# Patient Record
Sex: MALE | Race: BLACK OR AFRICAN AMERICAN | URBAN - METROPOLITAN AREA
[De-identification: names, ages, dates, MRNs, and addresses within clinical notes are randomized per-mention and may not be internally consistent; named-entity substitution may affect disease eponyms.]

---

## 2022-03-30 ENCOUNTER — HOSPITAL ENCOUNTER (OUTPATIENT)
Age: 67
Setting detail: SPECIMEN
Discharge: HOME | End: 2022-03-30

## 2022-03-30 LAB
ALBUMIN SERPL-MCNC: 4.1 G/DL (ref 3.5–5.2)
ALBUMIN/GLOBULIN RATIO: 1.3 (ref 1–2.5)
ALP BLD-CCNC: 57 U/L (ref 40–129)
ALT SERPL-CCNC: 13 U/L (ref 5–41)
ANION GAP SERPL CALCULATED.3IONS-SCNC: 14 MMOL/L (ref 9–17)
AST SERPL-CCNC: 17 U/L
BILIRUB SERPL-MCNC: 0.43 MG/DL (ref 0.3–1.2)
BUN BLDV-MCNC: 21 MG/DL (ref 8–23)
CALCIUM SERPL-MCNC: 9.2 MG/DL (ref 8.6–10.4)
CHLORIDE BLD-SCNC: 95 MMOL/L (ref 98–107)
CHOLESTEROL/HDL RATIO: 4
CHOLESTEROL: 210 MG/DL
CO2: 25 MMOL/L (ref 20–31)
CREAT SERPL-MCNC: 0.98 MG/DL (ref 0.7–1.2)
GFR AFRICAN AMERICAN: >60 ML/MIN
GFR NON-AFRICAN AMERICAN: >60 ML/MIN
GFR SERPL CREATININE-BSD FRML MDRD: ABNORMAL ML/MIN/{1.73_M2}
GLUCOSE BLD-MCNC: 144 MG/DL (ref 70–99)
HCT VFR BLD CALC: 46.5 % (ref 40.7–50.3)
HDLC SERPL-MCNC: 52 MG/DL
HEMOGLOBIN: 15 G/DL (ref 13–17)
LDL CHOLESTEROL: 127 MG/DL (ref 0–130)
MCH RBC QN AUTO: 31.4 PG (ref 25.2–33.5)
MCHC RBC AUTO-ENTMCNC: 32.3 G/DL (ref 28.4–34.8)
MCV RBC AUTO: 97.3 FL (ref 82.6–102.9)
NRBC AUTOMATED: 0 PER 100 WBC
PDW BLD-RTO: 14.8 % (ref 11.8–14.4)
PLATELET # BLD: 264 K/UL (ref 138–453)
PMV BLD AUTO: 11.6 FL (ref 8.1–13.5)
POTASSIUM SERPL-SCNC: 3.8 MMOL/L (ref 3.7–5.3)
RBC # BLD: 4.78 M/UL (ref 4.21–5.77)
SODIUM BLD-SCNC: 134 MMOL/L (ref 135–144)
TOTAL PROTEIN: 7.3 G/DL (ref 6.4–8.3)
TRIGL SERPL-MCNC: 155 MG/DL
WBC # BLD: 6.9 K/UL (ref 3.5–11.3)

## 2023-09-13 ENCOUNTER — APPOINTMENT (OUTPATIENT)
Dept: GENERAL RADIOLOGY | Age: 68
End: 2023-09-13
Payer: MEDICARE

## 2023-09-13 ENCOUNTER — HOSPITAL ENCOUNTER (INPATIENT)
Age: 68
LOS: 2 days | Discharge: HOME | End: 2023-09-15
Attending: EMERGENCY MEDICINE
Payer: MEDICARE

## 2023-09-13 ENCOUNTER — APPOINTMENT (OUTPATIENT)
Dept: MRI IMAGING | Age: 68
End: 2023-09-13
Payer: MEDICARE

## 2023-09-13 ENCOUNTER — APPOINTMENT (OUTPATIENT)
Dept: CT IMAGING | Age: 68
End: 2023-09-13
Payer: MEDICARE
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
## 2023-09-13 DIAGNOSIS — R53.1 RIGHT SIDED WEAKNESS: Primary | ICD-10-CM

## 2023-09-13 PROBLEM — I65.01 VERTEBRAL ARTERY STENOSIS, RIGHT: Status: ACTIVE | Noted: 2023-09-13

## 2023-09-13 LAB
ALBUMIN SERPL BCG-MCNC: 3.8 G/DL (ref 3.5–5.1)
ALP SERPL-CCNC: 64 U/L (ref 38–126)
ALT SERPL W/O P-5'-P-CCNC: 13 U/L (ref 11–66)
ANION GAP SERPL CALC-SCNC: 12 MEQ/L (ref 8–16)
ANION GAP SERPL CALC-SCNC: 13 MEQ/L (ref 8–16)
AST SERPL-CCNC: 14 U/L (ref 5–40)
BASOPHILS ABSOLUTE: 0.1 THOU/MM3 (ref 0–0.1)
BASOPHILS NFR BLD AUTO: 0.8 %
BILIRUB CONJ SERPL-MCNC: < 0.2 MG/DL (ref 0–0.3)
BILIRUB SERPL-MCNC: 0.5 MG/DL (ref 0.3–1.2)
BUN SERPL-MCNC: 19 MG/DL (ref 7–22)
BUN SERPL-MCNC: 21 MG/DL (ref 7–22)
CALCIUM SERPL-MCNC: 8.8 MG/DL (ref 8.5–10.5)
CALCIUM SERPL-MCNC: 8.9 MG/DL (ref 8.5–10.5)
CHLORIDE SERPL-SCNC: 97 MEQ/L (ref 98–111)
CHLORIDE SERPL-SCNC: 97 MEQ/L (ref 98–111)
CO2 SERPL-SCNC: 27 MEQ/L (ref 23–33)
CO2 SERPL-SCNC: 28 MEQ/L (ref 23–33)
CREAT SERPL-MCNC: 0.9 MG/DL (ref 0.4–1.2)
CREAT SERPL-MCNC: 1.1 MG/DL (ref 0.4–1.2)
DEPRECATED RDW RBC AUTO: 50.1 FL (ref 35–45)
EOSINOPHIL NFR BLD AUTO: 1.7 %
EOSINOPHILS ABSOLUTE: 0.1 THOU/MM3 (ref 0–0.4)
ERYTHROCYTE [DISTWIDTH] IN BLOOD BY AUTOMATED COUNT: 14.6 % (ref 11.5–14.5)
FLUAV RNA RESP QL NAA+PROBE: NOT DETECTED
FLUBV RNA RESP QL NAA+PROBE: NOT DETECTED
GFR SERPL CREATININE-BSD FRML MDRD: > 60 ML/MIN/1.73M2
GFR SERPL CREATININE-BSD FRML MDRD: > 60 ML/MIN/1.73M2
GLUCOSE BLD STRIP.AUTO-MCNC: 178 MG/DL (ref 70–108)
GLUCOSE SERPL-MCNC: 116 MG/DL (ref 70–108)
GLUCOSE SERPL-MCNC: 244 MG/DL (ref 70–108)
HCT VFR BLD AUTO: 43.9 % (ref 42–52)
HGB BLD-MCNC: 14.4 GM/DL (ref 14–18)
IMM GRANULOCYTES # BLD AUTO: 0.02 THOU/MM3 (ref 0–0.07)
IMM GRANULOCYTES NFR BLD AUTO: 0.2 %
LIPASE SERPL-CCNC: 20 U/L (ref 5.6–51.3)
LYMPHOCYTES ABSOLUTE: 2.1 THOU/MM3 (ref 1–4.8)
LYMPHOCYTES NFR BLD AUTO: 25.8 %
MAGNESIUM SERPL-MCNC: 1.6 MG/DL (ref 1.6–2.4)
MCH RBC QN AUTO: 31 PG (ref 26–33)
MCHC RBC AUTO-ENTMCNC: 32.8 GM/DL (ref 32.2–35.5)
MCV RBC AUTO: 94.6 FL (ref 80–94)
MONOCYTES ABSOLUTE: 0.5 THOU/MM3 (ref 0.4–1.3)
MONOCYTES NFR BLD AUTO: 6.1 %
NEUTROPHILS NFR BLD AUTO: 65.4 %
NRBC BLD AUTO-RTO: 0 /100 WBC
OSMOLALITY SERPL CALC.SUM OF ELEC: 284.9 MOSMOL/KG (ref 275–300)
PH BLDV: 7.35 [PH] (ref 7.31–7.41)
PLATELET # BLD AUTO: 274 THOU/MM3 (ref 130–400)
PMV BLD AUTO: 10.3 FL (ref 9.4–12.4)
POTASSIUM SERPL-SCNC: 3.5 MEQ/L (ref 3.5–5.2)
POTASSIUM SERPL-SCNC: 3.6 MEQ/L (ref 3.5–5.2)
PROT SERPL-MCNC: 7.2 G/DL (ref 6.1–8)
RBC # BLD AUTO: 4.64 MILL/MM3 (ref 4.7–6.1)
SARS-COV-2 RNA RESP QL NAA+PROBE: NOT DETECTED
SEGMENTED NEUTROPHILS ABSOLUTE COUNT: 5.4 THOU/MM3 (ref 1.8–7.7)
SODIUM SERPL-SCNC: 137 MEQ/L (ref 135–145)
SODIUM SERPL-SCNC: 137 MEQ/L (ref 135–145)
T4 FREE SERPL-MCNC: 1.47 NG/DL (ref 0.93–1.76)
TROPONIN, HIGH SENSITIVITY: 14 NG/L (ref 0–12)
TROPONIN, HIGH SENSITIVITY: 15 NG/L (ref 0–12)
TSH SERPL DL<=0.005 MIU/L-ACNC: 0.36 UIU/ML (ref 0.4–4.2)
WBC # BLD AUTO: 8.3 THOU/MM3 (ref 4.8–10.8)

## 2023-09-13 PROCEDURE — 71045 X-RAY EXAM CHEST 1 VIEW: CPT

## 2023-09-13 PROCEDURE — 70496 CT ANGIOGRAPHY HEAD: CPT

## 2023-09-13 PROCEDURE — 70498 CT ANGIOGRAPHY NECK: CPT

## 2023-09-13 PROCEDURE — 36415 COLL VENOUS BLD VENIPUNCTURE: CPT

## 2023-09-13 PROCEDURE — 83036 HEMOGLOBIN GLYCOSYLATED A1C: CPT

## 2023-09-13 PROCEDURE — 83735 ASSAY OF MAGNESIUM: CPT

## 2023-09-13 PROCEDURE — 6360000004 HC RX CONTRAST MEDICATION: Performed by: EMERGENCY MEDICINE

## 2023-09-13 PROCEDURE — 87636 SARSCOV2 & INF A&B AMP PRB: CPT

## 2023-09-13 PROCEDURE — 6370000000 HC RX 637 (ALT 250 FOR IP): Performed by: NURSE PRACTITIONER

## 2023-09-13 PROCEDURE — 70551 MRI BRAIN STEM W/O DYE: CPT

## 2023-09-13 PROCEDURE — 84484 ASSAY OF TROPONIN QUANT: CPT

## 2023-09-13 PROCEDURE — 70450 CT HEAD/BRAIN W/O DYE: CPT

## 2023-09-13 PROCEDURE — 80076 HEPATIC FUNCTION PANEL: CPT

## 2023-09-13 PROCEDURE — 99285 EMERGENCY DEPT VISIT HI MDM: CPT

## 2023-09-13 PROCEDURE — 2060000000 HC ICU INTERMEDIATE R&B

## 2023-09-13 PROCEDURE — 82800 BLOOD PH: CPT

## 2023-09-13 PROCEDURE — 82948 REAGENT STRIP/BLOOD GLUCOSE: CPT

## 2023-09-13 PROCEDURE — 99223 1ST HOSP IP/OBS HIGH 75: CPT | Performed by: NURSE PRACTITIONER

## 2023-09-13 PROCEDURE — 93010 ELECTROCARDIOGRAM REPORT: CPT | Performed by: INTERNAL MEDICINE

## 2023-09-13 PROCEDURE — 80048 BASIC METABOLIC PNL TOTAL CA: CPT

## 2023-09-13 PROCEDURE — 93005 ELECTROCARDIOGRAM TRACING: CPT | Performed by: EMERGENCY MEDICINE

## 2023-09-13 PROCEDURE — 84443 ASSAY THYROID STIM HORMONE: CPT

## 2023-09-13 PROCEDURE — 84439 ASSAY OF FREE THYROXINE: CPT

## 2023-09-13 PROCEDURE — 83690 ASSAY OF LIPASE: CPT

## 2023-09-13 PROCEDURE — 85025 COMPLETE CBC W/AUTO DIFF WBC: CPT

## 2023-09-13 RX ORDER — ASPIRIN 81 MG/1
324 TABLET, CHEWABLE ORAL ONCE
Status: COMPLETED | OUTPATIENT
Start: 2023-09-13 | End: 2023-09-14

## 2023-09-13 RX ORDER — DEXTROSE MONOHYDRATE 100 MG/ML
INJECTION, SOLUTION INTRAVENOUS CONTINUOUS PRN
Status: DISCONTINUED | OUTPATIENT
Start: 2023-09-13 | End: 2023-09-15 | Stop reason: HOSPADM

## 2023-09-13 RX ORDER — ONDANSETRON 4 MG/1
4 TABLET, ORALLY DISINTEGRATING ORAL EVERY 8 HOURS PRN
Status: DISCONTINUED | OUTPATIENT
Start: 2023-09-13 | End: 2023-09-13 | Stop reason: SDUPTHER

## 2023-09-13 RX ORDER — IBUPROFEN 600 MG/1
1 TABLET ORAL PRN
Status: DISCONTINUED | OUTPATIENT
Start: 2023-09-13 | End: 2023-09-15 | Stop reason: HOSPADM

## 2023-09-13 RX ORDER — ONDANSETRON 2 MG/ML
4 INJECTION INTRAMUSCULAR; INTRAVENOUS EVERY 6 HOURS PRN
Status: DISCONTINUED | OUTPATIENT
Start: 2023-09-13 | End: 2023-09-15 | Stop reason: HOSPADM

## 2023-09-13 RX ORDER — ASPIRIN 81 MG/1
81 TABLET, CHEWABLE ORAL DAILY
Status: DISCONTINUED | OUTPATIENT
Start: 2023-09-14 | End: 2023-09-15 | Stop reason: HOSPADM

## 2023-09-13 RX ORDER — ONDANSETRON 4 MG/1
4 TABLET, ORALLY DISINTEGRATING ORAL EVERY 8 HOURS PRN
Status: DISCONTINUED | OUTPATIENT
Start: 2023-09-13 | End: 2023-09-15 | Stop reason: HOSPADM

## 2023-09-13 RX ORDER — AMLODIPINE BESYLATE 5 MG/1
5 TABLET ORAL DAILY
Status: ON HOLD | COMMUNITY
End: 2023-09-15

## 2023-09-13 RX ORDER — ACETAMINOPHEN 325 MG/1
650 TABLET ORAL EVERY 6 HOURS PRN
Status: DISCONTINUED | OUTPATIENT
Start: 2023-09-13 | End: 2023-09-15 | Stop reason: HOSPADM

## 2023-09-13 RX ORDER — POLYETHYLENE GLYCOL 3350 17 G/17G
17 POWDER, FOR SOLUTION ORAL DAILY PRN
Status: DISCONTINUED | OUTPATIENT
Start: 2023-09-13 | End: 2023-09-15 | Stop reason: HOSPADM

## 2023-09-13 RX ORDER — ONDANSETRON 2 MG/ML
4 INJECTION INTRAMUSCULAR; INTRAVENOUS EVERY 6 HOURS PRN
Status: DISCONTINUED | OUTPATIENT
Start: 2023-09-13 | End: 2023-09-13 | Stop reason: SDUPTHER

## 2023-09-13 RX ORDER — ACETAMINOPHEN 650 MG/1
650 SUPPOSITORY RECTAL EVERY 6 HOURS PRN
Status: DISCONTINUED | OUTPATIENT
Start: 2023-09-13 | End: 2023-09-15 | Stop reason: HOSPADM

## 2023-09-13 RX ORDER — ENOXAPARIN SODIUM 100 MG/ML
40 INJECTION SUBCUTANEOUS DAILY
Status: DISCONTINUED | OUTPATIENT
Start: 2023-09-14 | End: 2023-09-15 | Stop reason: HOSPADM

## 2023-09-13 RX ORDER — CLOPIDOGREL BISULFATE 75 MG/1
300 TABLET ORAL ONCE
Status: COMPLETED | OUTPATIENT
Start: 2023-09-13 | End: 2023-09-14

## 2023-09-13 RX ORDER — ATORVASTATIN CALCIUM 80 MG/1
80 TABLET, FILM COATED ORAL NIGHTLY
Status: DISCONTINUED | OUTPATIENT
Start: 2023-09-13 | End: 2023-09-15 | Stop reason: HOSPADM

## 2023-09-13 RX ORDER — INSULIN LISPRO 100 [IU]/ML
0-8 INJECTION, SOLUTION INTRAVENOUS; SUBCUTANEOUS EVERY 4 HOURS
Status: DISCONTINUED | OUTPATIENT
Start: 2023-09-13 | End: 2023-09-14

## 2023-09-13 RX ORDER — CLOPIDOGREL BISULFATE 75 MG/1
75 TABLET ORAL DAILY
Status: DISCONTINUED | OUTPATIENT
Start: 2023-09-14 | End: 2023-09-15 | Stop reason: HOSPADM

## 2023-09-13 RX ADMIN — IOPAMIDOL 80 ML: 755 INJECTION, SOLUTION INTRAVENOUS at 18:17

## 2023-09-13 RX ADMIN — ATORVASTATIN CALCIUM 80 MG: 80 TABLET, FILM COATED ORAL at 23:08

## 2023-09-13 ASSESSMENT — PAIN SCALES - GENERAL
PAINLEVEL_OUTOF10: 0
PAINLEVEL_OUTOF10: 0

## 2023-09-13 ASSESSMENT — PAIN - FUNCTIONAL ASSESSMENT
PAIN_FUNCTIONAL_ASSESSMENT: NONE - DENIES PAIN
PAIN_FUNCTIONAL_ASSESSMENT: NONE - DENIES PAIN

## 2023-09-13 NOTE — ED NOTES
Patient resting in bed watching television, no distress noted. Respirations easy and unlabored. Denies any further needs or concerns. Call light remains in reach.       Angi Kennedy RN  09/13/23 2203

## 2023-09-13 NOTE — ED PROVIDER NOTES
[facility] DEPT  EMERGENCY DEPARTMENT ENCOUNTER          Pt Name: Jing Monge  MRN: [id number]  [address] [facility] [place] 1955  Date of evaluation: 9/13/2023  Physician: Sherley Piña MD  Supervising Attending Physician: Chelsea Roca MD       [address]       Chief Complaint   Patient presents with    Fall    Extremity Weakness         HISTORY OF PRESENT ILLNESS    HPI  Jing Monge is a 79 y.o. male with PMH significant for DMT2 and HTN who presents to the emergency department from home, by private vehicle for evaluation of RUE and RLE weakness s/p fall from standing while moving from the bathroom to his bedroom around 4 AM this morning. Patient states that he felt weak and unsteady in both of his legs, causing him to fall. Last known well at 11 PM last night when he went to bed. Patient states he felt weak and fell onto his right side without head trauma, LOC, or back pain. Assumed that the symptoms would resolve but when they had not this evening, she began to worry therefore he came to the ER. Of note, patient reports recent cough and congestion. Denies headache, blurred vision, trouble with mastication, dizziness, fatigue, fever, chest pain, shortness of breath, abdominal pain, nausea, vomiting, constipation, diarrhea, dysuria, melena, hematochezia, numbness/edema in the extremities. The patient has no other acute complaints at this time. PAST MEDICAL AND SURGICAL HISTORY     Past Medical History:   Diagnosis Date    Diabetes ([address])     Hypertension      No past surgical history on file. MEDICATIONS   No current facility-administered medications for this encounter.     Current Outpatient Medications:     amLODIPine (NORVASC) 5 MG tablet, Take 1 tablet by mouth daily, Disp: , Rfl:     Previous Medications    AMLODIPINE (NORVASC) 5 MG TABLET    Take 1 tablet by mouth daily         SOCIAL HISTORY     Social History     Social History Narrative    Not on file

## 2023-09-13 NOTE — ED NOTES
Patient resting in bed with family at bedside, denies any further needs or concerns at this time. Call light in reach. Will continue to monitor.       Angi Kennedy RN  09/13/23 3435

## 2023-09-13 NOTE — ED NOTES
Presents to ED with family via private car with complaints of worsening weakness. Pt states he tripped and fell in the bathroom and fell on his left side. PT denies hitting head and denies any pain from fall. Pt reports since the fall he has had increased weakness which has made it hard for him to walk or stand at times. He reports he normally walks without assistance, but upon arrival pt needing assist x2 to pivot to bed. Pt denies any other concerns and denies any current illness. EKG completed.      Angi Kennedy, MARTA  09/13/23 [id number]

## 2023-09-14 PROBLEM — D75.89 MACROCYTOSIS: Status: ACTIVE | Noted: 2023-09-14

## 2023-09-14 PROBLEM — E78.5 DYSLIPIDEMIA: Status: ACTIVE | Noted: 2023-09-14

## 2023-09-14 PROBLEM — F14.90 COCAINE USE: Status: ACTIVE | Noted: 2023-09-14

## 2023-09-14 PROBLEM — I63.9 ACUTE ISCHEMIC STROKE (HCC): Status: ACTIVE | Noted: 2023-09-14

## 2023-09-14 PROBLEM — F17.200 CURRENT EVERY DAY SMOKER: Status: ACTIVE | Noted: 2023-09-14

## 2023-09-14 PROBLEM — E11.65 UNCONTROLLED TYPE 2 DIABETES MELLITUS WITH HYPERGLYCEMIA (HCC): Status: ACTIVE | Noted: 2023-09-14

## 2023-09-14 PROBLEM — I10 ESSENTIAL HYPERTENSION: Status: ACTIVE | Noted: 2023-09-14

## 2023-09-14 PROBLEM — F12.90 MARIJUANA USE: Status: ACTIVE | Noted: 2023-09-14

## 2023-09-14 PROBLEM — R53.1 RIGHT SIDED WEAKNESS: Status: ACTIVE | Noted: 2023-09-14

## 2023-09-14 LAB
AMPHETAMINES UR QL SCN: NEGATIVE
ANION GAP SERPL CALC-SCNC: 11 MEQ/L (ref 8–16)
BACTERIA: ABNORMAL
BARBITURATES UR QL SCN: NEGATIVE
BENZODIAZ UR QL SCN: NEGATIVE
BILIRUB UR QL STRIP: NEGATIVE
BUN SERPL-MCNC: 18 MG/DL (ref 7–22)
BZE UR QL SCN: POSITIVE
CA-I BLD ISE-SCNC: 1.15 MMOL/L (ref 1.12–1.32)
CALCIUM SERPL-MCNC: 8.7 MG/DL (ref 8.5–10.5)
CANNABINOIDS UR QL SCN: POSITIVE
CASTS #/AREA URNS LPF: ABNORMAL /LPF
CASTS #/AREA URNS LPF: ABNORMAL /LPF
CHARACTER UR: CLEAR
CHARCOAL URNS QL MICRO: ABNORMAL
CHLORIDE SERPL-SCNC: 97 MEQ/L (ref 98–111)
CHOLEST SERPL-MCNC: 184 MG/DL (ref 100–199)
CO2 SERPL-SCNC: 28 MEQ/L (ref 23–33)
COLOR UR: YELLOW
CREAT SERPL-MCNC: 1.1 MG/DL (ref 0.4–1.2)
CRYSTALS URNS QL MICRO: ABNORMAL
DEPRECATED MEAN GLUCOSE BLD GHB EST-ACNC: 180 MG/DL (ref 70–126)
DEPRECATED RDW RBC AUTO: 48.2 FL (ref 35–45)
EKG ATRIAL RATE: 80 BPM
EKG P AXIS: 54 DEGREES
EKG P-R INTERVAL: 208 MS
EKG Q-T INTERVAL: 396 MS
EKG QRS DURATION: 106 MS
EKG QTC CALCULATION (BAZETT): 456 MS
EKG R AXIS: -42 DEGREES
EKG T AXIS: 66 DEGREES
EKG VENTRICULAR RATE: 80 BPM
EPITHELIAL CELLS, UA: ABNORMAL /HPF
ERYTHROCYTE [DISTWIDTH] IN BLOOD BY AUTOMATED COUNT: 14.4 % (ref 11.5–14.5)
FENTANYL: NEGATIVE
GFR SERPL CREATININE-BSD FRML MDRD: > 60 ML/MIN/1.73M2
GLUCOSE BLD STRIP.AUTO-MCNC: 120 MG/DL (ref 70–108)
GLUCOSE BLD STRIP.AUTO-MCNC: 160 MG/DL (ref 70–108)
GLUCOSE BLD STRIP.AUTO-MCNC: 164 MG/DL (ref 70–108)
GLUCOSE BLD STRIP.AUTO-MCNC: 168 MG/DL (ref 70–108)
GLUCOSE BLD STRIP.AUTO-MCNC: 245 MG/DL (ref 70–108)
GLUCOSE SERPL-MCNC: 156 MG/DL (ref 70–108)
GLUCOSE UR QL STRIP.AUTO: 500 MG/DL
HBA1C MFR BLD HPLC: 8 % (ref 4.4–6.4)
HCT VFR BLD AUTO: 40.3 % (ref 42–52)
HDLC SERPL-MCNC: 44 MG/DL
HGB BLD-MCNC: 13.3 GM/DL (ref 14–18)
HGB UR QL STRIP.AUTO: NEGATIVE
KETONES UR QL STRIP.AUTO: ABNORMAL
LDLC SERPL CALC-MCNC: 120 MG/DL
LEUKOCYTE ESTERASE UR QL STRIP.AUTO: NEGATIVE
MAGNESIUM SERPL-MCNC: 1.8 MG/DL (ref 1.6–2.4)
MCH RBC QN AUTO: 30.4 PG (ref 26–33)
MCHC RBC AUTO-ENTMCNC: 33 GM/DL (ref 32.2–35.5)
MCV RBC AUTO: 92.2 FL (ref 80–94)
NITRITE UR QL STRIP.AUTO: NEGATIVE
OPIATES UR QL SCN: NEGATIVE
OXYCODONE: NEGATIVE
PCP UR QL SCN: NEGATIVE
PH UR STRIP.AUTO: 6 [PH] (ref 5–9)
PLATELET # BLD AUTO: 277 THOU/MM3 (ref 130–400)
PMV BLD AUTO: 10.4 FL (ref 9.4–12.4)
POTASSIUM SERPL-SCNC: 3.1 MEQ/L (ref 3.5–5.2)
PROT UR STRIP.AUTO-MCNC: 100 MG/DL
RBC # BLD AUTO: 4.37 MILL/MM3 (ref 4.7–6.1)
RBC #/AREA URNS HPF: ABNORMAL /HPF
RENAL EPI CELLS #/AREA URNS HPF: ABNORMAL /[HPF]
SODIUM SERPL-SCNC: 136 MEQ/L (ref 135–145)
SP GR UR REFRACT.AUTO: > 1.03 (ref 1–1.03)
TRIGL SERPL-MCNC: 100 MG/DL (ref 0–199)
UROBILINOGEN UR QL STRIP.AUTO: 1 EU/DL (ref 0–1)
WBC # BLD AUTO: 7.9 THOU/MM3 (ref 4.8–10.8)
WBC #/AREA URNS HPF: ABNORMAL /HPF
YEAST LIKE FUNGI URNS QL MICRO: ABNORMAL

## 2023-09-14 PROCEDURE — 93306 TTE W/DOPPLER COMPLETE: CPT

## 2023-09-14 PROCEDURE — 6370000000 HC RX 637 (ALT 250 FOR IP): Performed by: NURSE PRACTITIONER

## 2023-09-14 PROCEDURE — 80061 LIPID PANEL: CPT

## 2023-09-14 PROCEDURE — 6360000002 HC RX W HCPCS: Performed by: NURSE PRACTITIONER

## 2023-09-14 PROCEDURE — 36415 COLL VENOUS BLD VENIPUNCTURE: CPT

## 2023-09-14 PROCEDURE — 97162 PT EVAL MOD COMPLEX 30 MIN: CPT

## 2023-09-14 PROCEDURE — 81001 URINALYSIS AUTO W/SCOPE: CPT

## 2023-09-14 PROCEDURE — 97166 OT EVAL MOD COMPLEX 45 MIN: CPT

## 2023-09-14 PROCEDURE — 2060000000 HC ICU INTERMEDIATE R&B

## 2023-09-14 PROCEDURE — 82330 ASSAY OF CALCIUM: CPT

## 2023-09-14 PROCEDURE — 83735 ASSAY OF MAGNESIUM: CPT

## 2023-09-14 PROCEDURE — 97535 SELF CARE MNGMENT TRAINING: CPT

## 2023-09-14 PROCEDURE — 85027 COMPLETE CBC AUTOMATED: CPT

## 2023-09-14 PROCEDURE — 6370000000 HC RX 637 (ALT 250 FOR IP): Performed by: HOSPITALIST

## 2023-09-14 PROCEDURE — 80307 DRUG TEST PRSMV CHEM ANLYZR: CPT

## 2023-09-14 PROCEDURE — 99232 SBSQ HOSP IP/OBS MODERATE 35: CPT | Performed by: HOSPITALIST

## 2023-09-14 PROCEDURE — 97116 GAIT TRAINING THERAPY: CPT

## 2023-09-14 PROCEDURE — 82948 REAGENT STRIP/BLOOD GLUCOSE: CPT

## 2023-09-14 PROCEDURE — 80048 BASIC METABOLIC PNL TOTAL CA: CPT

## 2023-09-14 RX ORDER — POTASSIUM CHLORIDE 7.45 MG/ML
10 INJECTION INTRAVENOUS PRN
Status: DISCONTINUED | OUTPATIENT
Start: 2023-09-14 | End: 2023-09-15 | Stop reason: HOSPADM

## 2023-09-14 RX ORDER — POTASSIUM CHLORIDE 20 MEQ/1
40 TABLET, EXTENDED RELEASE ORAL PRN
Status: DISCONTINUED | OUTPATIENT
Start: 2023-09-14 | End: 2023-09-15 | Stop reason: HOSPADM

## 2023-09-14 RX ORDER — INSULIN LISPRO 100 [IU]/ML
0-8 INJECTION, SOLUTION INTRAVENOUS; SUBCUTANEOUS
Status: DISCONTINUED | OUTPATIENT
Start: 2023-09-14 | End: 2023-09-15 | Stop reason: HOSPADM

## 2023-09-14 RX ADMIN — POTASSIUM CHLORIDE 40 MEQ: 1500 TABLET, EXTENDED RELEASE ORAL at 15:12

## 2023-09-14 RX ADMIN — CLOPIDOGREL BISULFATE 300 MG: 75 TABLET ORAL at 01:12

## 2023-09-14 RX ADMIN — INSULIN LISPRO 2 UNITS: 100 INJECTION, SOLUTION INTRAVENOUS; SUBCUTANEOUS at 11:26

## 2023-09-14 RX ADMIN — ENOXAPARIN SODIUM 40 MG: 100 INJECTION SUBCUTANEOUS at 09:46

## 2023-09-14 RX ADMIN — CLOPIDOGREL BISULFATE 75 MG: 75 TABLET ORAL at 09:46

## 2023-09-14 RX ADMIN — ASPIRIN 81 MG 324 MG: 81 TABLET ORAL at 01:12

## 2023-09-14 RX ADMIN — ASPIRIN 81 MG 81 MG: 81 TABLET ORAL at 09:46

## 2023-09-14 RX ADMIN — ATORVASTATIN CALCIUM 80 MG: 80 TABLET, FILM COATED ORAL at 20:33

## 2023-09-14 ASSESSMENT — ENCOUNTER SYMPTOMS
ABDOMINAL PAIN: 0
VOMITING: 0
COUGH: 0
SHORTNESS OF BREATH: 0
RHINORRHEA: 0
NAUSEA: 0
SORE THROAT: 0

## 2023-09-14 ASSESSMENT — PAIN SCALES - GENERAL
PAINLEVEL_OUTOF10: 0

## 2023-09-14 NOTE — CARE COORDINATION
Case Management Assessment  Initial Evaluation    Date/Time of Evaluation: 9/14/2023 10:47 AM  Assessment Completed by: Emma Leyva RN    If patient is discharged prior to next notation, then this note serves as note for discharge by case management. Patient Name: Inna Quiles                   YOB: 1955  Diagnosis: Right sided weakness [R53.1]  Fall from standing, initial encounter [W19. XXXA]  Vertebral artery stenosis, right [I65.01]                   Date / Time: 9/13/2023  4:06 PM  Location: [facility]19/[facility]     Patient Admission Status: Inpatient   Readmission Risk Low 0-14, Mod 15-19), High > 20: Readmission Risk Score: 8.4    Current PCP: ENOC Cabrera CNP  PCP verified by ? Yes    Chart Reviewed: Yes      History Provided by: Patient  Patient Orientation: Alert and Oriented    Patient Cognition: Alert    Hospitalization in the last 30 days (Readmission):  No    If yes, Readmission Assessment in CM Navigator will be completed. Advance Directives:      Code Status: Full Code   Patient's Primary Decision Maker is: Legal Next of Kin (Emergency contact added to epic.)      Discharge Planning:    Patient lives with: Friends Type of Home: House  Primary Care Giver: Self  Patient Support Systems include: Children, Family Members, Friends/Neighbors   Current Financial resources: Medicare  Current community resources: None  Current services prior to admission: Durable Medical Equipment            Current DME: Walker            Type of Home Care services:  None    ADLS  Prior functional level: Independent in ADLs/IADLs  Current functional level: Assistance with the following:, Shopping, Housework    Family can provide assistance at DC: Yes  Would you like Case Management to discuss the discharge plan with any other family members/significant others, and if so, who?  No  Plans to Return to Present Housing: Yes  Other Identified Issues/Barriers to RETURNING to current housing: n/a  Potential Goals/Plan/Treatment Preferences: Spoke with [employer], he plans to return home with roommate. He does have a daughter in area. Uses a walker at times. Follow for OP therapy needs. Emergency contact added to epic. May need a cab ride home. Transportation/Food Security/Housekeeping Addressed: No issues identified.      Juice Armstrong RN  Case Management Department

## 2023-09-14 NOTE — PLAN OF CARE
Problem: Discharge Planning  Goal: Discharge to home or other facility with appropriate resources  9/14/2023 1004 by Marlen Short RN  Outcome: Progressing  Flowsheets (Taken 9/14/2023 1004)  Discharge to home or other facility with appropriate resources:   Identify barriers to discharge with patient and caregiver   Arrange for needed discharge resources and transportation as appropriate   Identify discharge learning needs (meds, wound care, etc)   Arrange for interpreters to assist at discharge as needed   Refer to discharge planning if patient needs post-hospital services based on physician order or complex needs related to functional status, cognitive ability or social support system       Problem: Pain  Goal: Verbalizes/displays adequate comfort level or baseline comfort level  9/14/2023 1004 by Marlen Short RN  Outcome: Progressing  Flowsheets (Taken 9/14/2023 1004)  Verbalizes/displays adequate comfort level or baseline comfort level:   Encourage patient to monitor pain and request assistance   Assess pain using appropriate pain scale   Administer analgesics based on type and severity of pain and evaluate response   Implement non-pharmacological measures as appropriate and evaluate response   Consider cultural and social influences on pain and pain management   Notify Licensed Independent Practitioner if interventions unsuccessful or patient reports new pain       Problem: Safety - Adult  Goal: Free from fall injury  9/14/2023 1004 by Marlen Short RN  Outcome: Progressing  Note: Patient remained free from falls this shift. Bed is in low position with alarm on and siderails up x2. Patient ambulates with one assist. Education given on use of call light and patient voiced understanding. Patient uses call light appropriately. Call light and beside table within reach. Arm band and falling star in place.          Problem: Neurosensory - Adult  Goal: Achieves stable or improved neurological status  9/14/2023 1004 by therapy   Encourage visually impaired, hearing impaired and aphasic patients to use assistive/communication devices       Problem: Skin/Tissue Integrity - Adult  Goal: Skin integrity remains intact  9/14/2023 1004 by Franco Duverney, RN  Outcome: Progressing  Flowsheets (Taken 9/14/2023 1004)  Skin Integrity Remains Intact: Monitor for areas of redness and/or skin breakdown       Problem: Musculoskeletal - Adult  Goal: Return mobility to safest level of function  9/14/2023 1004 by Franco Duverney, RN  Outcome: Progressing  Flowsheets (Taken 9/14/2023 1004)  Return Mobility to Safest Level of Function:   Assess patient stability and activity tolerance for standing, transferring and ambulating with or without assistive devices   Assist with transfers and ambulation using safe patient handling equipment as needed   Ensure adequate protection for wounds/incisions during mobilization   Obtain physical therapy/occupational therapy consults as needed   Instruct patient/family in ordered activity level    Goal: Return ADL status to a safe level of function  9/14/2023 1004 by Franco Duverney, RN  Outcome: Progressing  Flowsheets (Taken 9/14/2023 1004)  Return ADL Status to a Safe Level of Function:   Administer medication as ordered   Assess activities of daily living deficits and provide assistive devices as needed   Obtain physical therapy/occupational therapy consults as needed   Assist and instruct patient to increase activity and self care as tolerated       Problem: Chronic Conditions and Co-morbidities  Goal: Patient's chronic conditions and co-morbidity symptoms are monitored and maintained or improved  Outcome: Progressing  Flowsheets (Taken 9/14/2023 1004)  Care Plan - Patient's Chronic Conditions and Co-Morbidity Symptoms are Monitored and Maintained or Improved:   Monitor and assess patient's chronic conditions and comorbid symptoms for stability, deterioration, or improvement   Collaborate with multidisciplinary team to

## 2023-09-14 NOTE — PLAN OF CARE
Problem: Discharge Planning  Goal: Discharge to home or other facility with appropriate resources  Outcome: Progressing  Flowsheets (Taken 9/14/2023 0045)  Discharge to home or other facility with appropriate resources:   Identify barriers to discharge with patient and caregiver   Identify discharge learning needs (meds, wound care, etc)   Refer to discharge planning if patient needs post-hospital services based on physician order or complex needs related to functional status, cognitive ability or social support system   Arrange for needed discharge resources and transportation as appropriate     Problem: Pain  Goal: Verbalizes/displays adequate comfort level or baseline comfort level  Outcome: Progressing  Flowsheets (Taken 9/14/2023 0045)  Verbalizes/displays adequate comfort level or baseline comfort level:   Encourage patient to monitor pain and request assistance   Assess pain using appropriate pain scale   Administer analgesics based on type and severity of pain and evaluate response   Implement non-pharmacological measures as appropriate and evaluate response   Consider cultural and social influences on pain and pain management   Notify Licensed Independent Practitioner if interventions unsuccessful or patient reports new pain     Problem: Safety - Adult  Goal: Free from fall injury  Outcome: Progressing  Flowsheets (Taken 9/14/2023 0045)  Free From Fall Injury: Instruct family/caregiver on patient safety  Note: Patient remains free from falls at this time. Bed is locked in lowest position with alarm on and side rails up. Call light and personal belongings are within reach. \"Call, don't fall\" policy explained. Fall wristband and nonslip socks are on patient and fall risk sign is posted.       Problem: Neurosensory - Adult  Goal: Achieves stable or improved neurological status  Outcome: Progressing  Flowsheets (Taken 9/14/2023 0045)  Achieves stable or improved neurological status:   Assess for and report changes in neurological status   Initiate measures to prevent increased intracranial pressure   Maintain blood pressure and fluid volume within ordered parameters to optimize cerebral perfusion and minimize risk of hemorrhage   Monitor temperature, glucose, and sodium. Initiate appropriate interventions as ordered     Problem: Neurosensory - Adult  Goal: Absence of seizures  Outcome: Progressing  Flowsheets (Taken 9/14/2023 0045)  Absence of seizures:   Monitor for seizure activity. If seizure occurs, document type and location of movements and any associated apnea   Administer anticonvulsants as ordered   Diagnostic studies as ordered   If seizure occurs, turn head to side and suction secretions as needed   Support airway/breathing, administer oxygen as needed  Note: Pt remains free from seizure activity at this time. Will continue to monitor.      Problem: Neurosensory - Adult  Goal: Remains free of injury related to seizures activity  Outcome: Progressing  Flowsheets (Taken 9/14/2023 0045)  Remains free of injury related to seizure activity:   Maintain airway, patient safety  and administer oxygen as ordered   Monitor patient for seizure activity, document and report duration and description of seizure to Licensed Independent Practitioner   If seizure occurs, turn patient to side and suction secretions as needed   Reorient patient post seizure   Seizure pads on all 4 side rails   Instruct patient/family to notify RN of any seizure activity   Instruct patient/family to call for assistance with activity based on assessment     Problem: Neurosensory - Adult  Goal: Achieves maximal functionality and self care  Outcome: Progressing  Flowsheets (Taken 9/14/2023 0045)  Achieves maximal functionality and self care:   Monitor swallowing and airway patency with patient fatigue and changes in neurological status   Encourage and assist patient to increase activity and self care with guidance from physical

## 2023-09-14 NOTE — PROGRESS NOTES
Patient admitted to  Room 19 in a wheelchair and from ED  Complaint upon arrival to the room R side weakness and fell at home. IV in L AC flushed and capped, site free of s/s of infection or infiltration. Vital signs obtained. Assessment and data collection initiated. Oriented to room. Policies and procedures for  explained All questions answered with no further questions at this time. Fall prevention and safety precautions discussed with patient. 2 person skin check completed with MARTA Lockhart. Was swallow screen completed on admission? [x] YES or [] NO  If patient failed swallow test, obtain order for speech therapy consult and keep NPO.

## 2023-09-14 NOTE — PROGRESS NOTES
[facility] 4A - [id number]    Time In: 7917  Time Out: 8935  Timed Code Treatment Minutes: 8 Minutes  Minutes: 15          Date: 2023  Patient Name: Georgette Shannon,  Gender:  male        MRN: [id number]  : 1955  (79 y.o.)      Referring Practitioner: Melvin Alpers, CNP  Diagnosis: right sided weakness  Additional Pertinent Hx: Klever Harmon is a 46-year-old -American male presented to [facility] ER 2023 with chief complaint of right lower extremity right upper extremity weakness. Patient has a past medical history significant for current everyday smoker, asthma, essential hypertension, GERD, DMT2,  polysubstance use. Patient identifies use of cocaine within the last 24 hours. Patient recently moved to [place] [facility] in the last 4 to 6 weeks. Patient has no established PCP. Gail Roque presented from home, by private vehicle for evaluation of RUE and RLE weakness s/p fall from standing while moving from the bathroom to his bedroom around 4 AM this morning. Patient states that he felt weak and unsteady in both of his legs, causing him to fall. Last known well 2300 2023 when he went to bed. Patient states he felt weak and fell onto his right side without head trauma, LOC, or back pain. Assumed that the symptoms would resolve but when they had not this evening, he sought treatment and evaluation. MRI-1. Acute infarct left frontal lobe in the left LULU territory. 2. Atrophy and chronic small-vessel ischemic changes. 3. Multifocal chronic infarcts. CTA of neck-Mild-to-moderate disease bilateral carotid bulbs without significant   stenosis. 80% stenosis origin right vertebral artery.      Restrictions/Precautions:  Restrictions/Precautions: Fall Risk    Subjective:  Chart Reviewed: Yes  Patient assessed for rehabilitation services?: Yes  Subjective: pt asleep upon arrival, per pt is no longer a training, Functional mobility training, Gait training, Stair training, Transfer training, Patient/Caregiver education & training, Safety education & training, Home exercise program, Therapeutic activities  General Plan:  (5-6X N)    Goals:  Patient Goals : go home  Short Term Goals  Time Frame for Short Term Goals: by discharge  Short Term Goal 1: bed mobility with MOD I to get in/out of bed  Short Term Goal 2: transfer with MOD I to get in/out of chairs  Short Term Goal 3: amb >150'x1 without AD and MOD I to walk safely in home  Short Term Goal 4: negotiate 2 steps with HR and MOD I to enter home safely  Long Term Goals  Time Frame for Long Term Goals : no LTGs set secondary to short ELOS    Following session, patient left in safe position with all fall risk precautions in place.

## 2023-09-14 NOTE — PROGRESS NOTES
This Virtual RN completed admission requirements. Pt resting comfortably in bed, call light within reach. Educated patient to use call light and to notify bedside nurse if there is a change in condition or if any help is needed. Understanding verbalized. No questions or concerns at this time. Family at bedside.

## 2023-09-14 NOTE — H&P
Hospitalist  History and Physical    Patient:  Georgette Shannon  MRN: [id number]    CHIEF COMPLAINT:  fall and extremity weakness    History Obtained From:  patient, electronic medical record  PCP: ENOC Singh CNP    HISTORY OF PRESENT ILLNESS:   Georgette Shannon is a 72-year-old -American male presented to [facility] ER 9/13/2023 with chief complaint of right lower extremity right upper extremity weakness. Patient has a past medical history significant for current everyday smoker, asthma, essential hypertension, GERD, DMT2,  polysubstance use. Patient identifies use of cocaine within the last 24 hours. Patient recently moved to [place] [facility] in the last 4 to 6 weeks. Patient has no established PCP. Gail Me presented from home, by private vehicle for evaluation of RUE and RLE weakness s/p fall from standing while moving from the bathroom to his bedroom around 4 AM this morning. Patient states that he felt weak and unsteady in both of his legs, causing him to fall. Last known well 2300 9/12/2023 when he went to bed. Patient states he felt weak and fell onto his right side without head trauma, LOC, or back pain. Assumed that the symptoms would resolve but when they had not this evening, he sought treatment and evaluation. Past Medical History:        Diagnosis Date    Diabetes ([address])     Hypertension        Past Surgical History:    No past surgical history on file. Medications Prior to Admission:    Prior to Admission medications    Medication Sig Start Date End Date Taking? Authorizing Provider   amLODIPine (NORVASC) 5 MG tablet Take 1 tablet by mouth daily   Yes Historical Provider, MD       Allergies:  Pcn [penicillins]    Social History:   TOBACCO:   has no history on file for tobacco use. ETOH:   has no history on file for alcohol use. OCCUPATION: Retired. . Recently moved to [facility] from [place] in the past 3 to 4 weeks.     Family History:   No family history on

## 2023-09-14 NOTE — DISCHARGE INSTRUCTIONS
Please document Modified Grimes Score on the Barthel Index Scale flow sheet before discharge. 1 - No significant disability: despite symptoms, able to carry out all usual duties and activities.

## 2023-09-14 NOTE — CONSULTS
Neurology Consult Note    Date:9/14/2023       [facility]:[id number]  Patient Claudia Thomas     YOB: 1955     Age:68 y.o. Requesting Physician: Rupal Pacheco MD     Reason for Consult:  Evaluate for RUE & RLE weakness - concern for possible subacute lacunar infarct      Chief Complaint:   Chief Complaint   Patient presents with    Fall    Extremity Weakness       Giselle Healy is a 79 y.o. male with a history of current everyday smoker, asthma, hypertension, GERD, diabetes, polysubstance use who presented to [facility] ED yesterday for the evaluation of right upper and right lower extremity weakness s/p fall. Patient states that he woke up around 4:00 AM 9/13 and fell secondary to weakness in his right upper and lower extremity. He states he was walking from the bathroom back to his bedroom when this weakness began. He states that he went back to bed and figured the symptoms would resolve but they didn't so he sought further evaluation. Patient states that he has never had this happen to him before. He denies any known history of strokes. He denies any known history of atrial fibrillation. He confirms taking any antiplatelets or anticoagulation prior to admission. Patient states he is an everyday smoker and doesn't have any desire to quit. He confirms cocaine use as well. He states he will use cocaine when he is able to buy it. He confirms his last use was within the last 24 hours before arrival. Patient states he was born and raised here in [facility] but recently just moved back from [place] within the last month secondary to splitting up with his wife. CT head obtained and revealed no acute intracranial process, it did reveal an old left thalamus infarct. CTA head and neck obtained and revealed 80% stenosis at the origin of the right vertebral artery, which is nonconcerning given that the right vertebral artery is nondominant.  MRI brain WO obtained and revealed acute infarct in the left to name object. Able to repeat. Normal comprehension. Cranial Nerves     CN II   Visual fields full to confrontation. Right visual field deficit: none  Left visual field deficit: none     CN III, IV, VI   Pupils are equal, round, and reactive to light. Extraocular motions are normal.   Right pupil: Shape: regular. Reactivity: brisk. Left pupil: Shape: regular. Reactivity: brisk. CN V   Facial sensation intact. Right facial sensation deficit: none  Left facial sensation deficit: none    CN VII   Facial expression full, symmetric. Right facial weakness: none  Left facial weakness: none    CN VIII   CN VIII normal.   Hearing: intact    CN IX, X   CN IX normal.   CN X normal.   Palate: symmetric    CN XI   CN XI normal.   Right trapezius strength: normal  Left trapezius strength: normal    CN XII   CN XII normal.   Tongue: not atrophic  Fasciculations: absent  Tongue deviation: none    Motor Exam   Muscle bulk: normal  Overall muscle tone: normal  Right arm tone: normal  Left arm tone: normal  Right arm pronator drift: absent  Left arm pronator drift: absent  Right leg tone: normal  Left leg tone: normal  Patient antigravity without drift in bilateral upper and lower extremities. Right side slightly weaker comparatively.  4/5 strength in RUE and RLE  Strength 5/5 in LUE and LLE     Sensory Exam   Light touch normal.     Gait, Coordination, and Reflexes     Coordination   Finger to nose coordination: normal  Heel to shin coordination: normal       Labs/Imaging/Diagnostics   Labs:  CBC:  Recent Labs     09/13/23  1610 09/14/23  0408   WBC 8.3 7.9   RBC 4.64* 4.37*   HGB 14.4 13.3*   HCT 43.9 40.3*   MCV 94.6* 92.2    277     CHEMISTRIES:  Recent Labs     09/13/23  1610 09/13/23  2126 09/14/23  0408    137 136   K 3.5 3.6 3.1*   CL 97* 97* 97*   CO2 27 28 28   BUN 21 19 18   CREATININE 1.1 0.9 1.1   GLUCOSE 244* 116* 156*   MG 1.6  --  1.8     COAGULATION STUDIES:No results for input(s):

## 2023-09-14 NOTE — PROGRESS NOTES
Reached out to patient to screen for [facility]. Patient had previously carried [place] which had termed on 8/31/23. Informed patient of this and he stated he was unaware of the termination. Patient called insurance company and has stated that [facility] will be reinstating his coverage but he does not know when the insurance will be active again. I will reach out to patient on 9/15 and see if insurance active at that time. If insurance still not active, patient likely to be enrolled in [facility]. If there are any questions, please call me. Thank you!   Merlynn Romberg, [facility] - Prescription Assistance (ext. [phone]) 9/14/2023,3:21 PM

## 2023-09-14 NOTE — PROGRESS NOTES
Patient/family has been educated on their personal risk factors of: hypertension, diabetes, smoking, and drug use. They have been given hand outs on the following medications: Aspirin, plavix, lipitor, and amlodipine. Treatment for stroke includes:  Risk factor modifications  Following the medication regime prescribed by physician    Educated on BE-Balance-Eyesight FAST-Face-Arm-Speech-Time    A stroke is a brain attack. Stroke is a brain injury. It occurs when the brain's blood supply is interrupted. Blood carries oxygen and nutrients to the brain. Without oxygen and nutrients from blood, brain tissue starts to die rapidly. This can happen in less than 10 minutes. A stroke occurs when blood flow to the brain is blocked (called ischemic stroke). This is caused by one of the following:   Sudden decreased blood flow   Damage to a blood vessel supplying blood to the brain can occur suddenly from either:   Injury   A clot that forms and breaks off from another part of the body (such as the heart or neck)   There are certain conditions which predispose people to form blood clots, such as:   Cancer   Pregnancy   Atrial fibrillation   Certain autoimmune diseases   Local blood clot   A build-up of fatty substances ( atherosclerotic plaque ) along the inner lining of the artery causes:   Narrowing of artery   Reduced elasticity   Local inflammation   Blood protein defects leading to increased clotting tendency   Decreased blood flow in the artery   Clot in an artery supplying the brain   Inflammatory conditions in the blood vessels (vasculitis)   A stroke may also occur if a blood vessel breaks and bleeds into or around the brain. This is called hemorrhagic stroke. This condition needs to be monitored closely. Be sure to keep all appointments. Have exams and blood tests done as directed. Call 911 If Any of the Following Occurs   It is important that you and those around you know the warning signs for stroke.

## 2023-09-14 NOTE — PROGRESS NOTES
[facility] OCCUPATIONAL THERAPY  [facility] NEUROSCIENCES 4A  EVALUATION    Time:    Time In: 1400  Time Out: 1438  Timed Code Treatment Minutes: 28 Minutes  Minutes: 38          Date: 2023  Patient Name: Yoseph Murray,   Gender: male      MRN: [id number]  : 1955  (79 y.o.)  Referring Practitioner: Shell Martinez, APRN - CNP  Diagnosis: Right sided weakness  Additional Pertinent Hx: Brianna Way is a 74-year-old -American male presented to [facility] ER 2023 with chief complaint of right lower extremity right upper extremity weakness. Patient has a past medical history significant for current everyday smoker, asthma, essential hypertension, GERD, DMT2,  polysubstance use. Patient identifies use of cocaine within the last 24 hours. Patient recently moved to [place] [facility] in the last 4 to 6 weeks. Patient has no established PCP. Dayne Larose presented from home, by private vehicle for evaluation of RUE and RLE weakness s/p fall from standing while moving from the bathroom to his bedroom around 4 AM this morning. Patient states that he felt weak and unsteady in both of his legs, causing him to fall. Last known well 2300 2023 when he went to bed. Patient states he felt weak and fell onto his right side without head trauma, LOC, or back pain. Assumed that the symptoms would resolve but when they had not this evening, he sought treatment and evaluation. MRI-1. Acute infarct left frontal lobe in the left LULU territory. 2. Atrophy and chronic small-vessel ischemic changes. 3. Multifocal chronic infarcts. CTA of neck-Mild-to-moderate disease bilateral carotid bulbs without significant   stenosis. 80% stenosis origin right vertebral artery.     Restrictions/Precautions:  Restrictions/Precautions: Fall Risk    Subjective  Chart Reviewed: Yes, Progress Notes, Orders, History and Physical  Patient assessed for rehabilitation services?: Patient/Caregiver education & training, Equipment evaluation, education, & procurement, Self-Care / ADL, Home management training. See long-term goal time frame for expected duration of plan of care. If no long-term goals established, a short length of stay is anticipated. Goals:     Short Term Goals  Time Frame for Short Term Goals: By discharge  Short Term Goal 1: Pt will navigate around nursing unit with Ashley to increase access to I/ADLs. Short Term Goal 2: Pt will tolerate standing for 10 minutes with Ashley and BUE release to increase balance for I/ADLs. Short Term Goal 3: Pt will complete bathing/dressing with Ashley to increase independence with ADLs. Short Term Goal 4: Pt will complete an IADL with Ashley to increase independence and safety for home. Short Term Goal 5: Pt will increase [place] evidenced by improved 9HPT by 12 seconds to increase ability to perform fine motor tasks during I/ADLs. Long Term Goals  Time Frame for Long Term Goals : Not set due to ELOS         Following session, patient left in safe position with all fall risk precautions in place.

## 2023-09-15 VITALS
BODY MASS INDEX: 27.85 KG/M2 | WEIGHT: 188 LBS | SYSTOLIC BLOOD PRESSURE: 155 MMHG | DIASTOLIC BLOOD PRESSURE: 99 MMHG | OXYGEN SATURATION: 97 % | HEIGHT: 69 IN | HEART RATE: 82 BPM | TEMPERATURE: 98.4 F | RESPIRATION RATE: 16 BRPM

## 2023-09-15 LAB
GLUCOSE BLD STRIP.AUTO-MCNC: 123 MG/DL (ref 70–108)
GLUCOSE BLD STRIP.AUTO-MCNC: 142 MG/DL (ref 70–108)
GLUCOSE BLD STRIP.AUTO-MCNC: 205 MG/DL (ref 70–108)

## 2023-09-15 PROCEDURE — 97161 PT EVAL LOW COMPLEX 20 MIN: CPT

## 2023-09-15 PROCEDURE — 82948 REAGENT STRIP/BLOOD GLUCOSE: CPT

## 2023-09-15 PROCEDURE — 93270 REMOTE 30 DAY ECG REV/REPORT: CPT

## 2023-09-15 PROCEDURE — 97530 THERAPEUTIC ACTIVITIES: CPT

## 2023-09-15 PROCEDURE — 6370000000 HC RX 637 (ALT 250 FOR IP): Performed by: NURSE PRACTITIONER

## 2023-09-15 PROCEDURE — 6360000002 HC RX W HCPCS: Performed by: NURSE PRACTITIONER

## 2023-09-15 PROCEDURE — 97535 SELF CARE MNGMENT TRAINING: CPT

## 2023-09-15 PROCEDURE — 6370000000 HC RX 637 (ALT 250 FOR IP): Performed by: HOSPITALIST

## 2023-09-15 PROCEDURE — 99239 HOSP IP/OBS DSCHRG MGMT >30: CPT | Performed by: HOSPITALIST

## 2023-09-15 PROCEDURE — 97116 GAIT TRAINING THERAPY: CPT

## 2023-09-15 RX ORDER — SITAGLIPTIN AND METFORMIN HYDROCHLORIDE 1000; 50 MG/1; MG/1
1 TABLET, FILM COATED ORAL DAILY
COMMUNITY

## 2023-09-15 RX ORDER — CLOPIDOGREL BISULFATE 75 MG/1
75 TABLET ORAL DAILY
Qty: 19 TABLET | Refills: 0 | Status: SHIPPED | OUTPATIENT
Start: 2023-09-16 | End: 2023-10-05

## 2023-09-15 RX ORDER — AMLODIPINE AND BENAZEPRIL HYDROCHLORIDE 10; 40 MG/1; MG/1
1 CAPSULE ORAL DAILY
COMMUNITY
Start: 2023-09-15

## 2023-09-15 RX ORDER — ASPIRIN 81 MG/1
81 TABLET, CHEWABLE ORAL DAILY
Qty: 30 TABLET | Refills: 3 | Status: SHIPPED | OUTPATIENT
Start: 2023-09-16

## 2023-09-15 RX ORDER — AMLODIPINE BESYLATE 10 MG/1
10 TABLET ORAL DAILY
Status: DISCONTINUED | OUTPATIENT
Start: 2023-09-15 | End: 2023-09-15 | Stop reason: HOSPADM

## 2023-09-15 RX ORDER — ATORVASTATIN CALCIUM 80 MG/1
80 TABLET, FILM COATED ORAL NIGHTLY
Qty: 30 TABLET | Refills: 3 | Status: SHIPPED | OUTPATIENT
Start: 2023-09-15

## 2023-09-15 RX ADMIN — ENOXAPARIN SODIUM 40 MG: 100 INJECTION SUBCUTANEOUS at 09:08

## 2023-09-15 RX ADMIN — CLOPIDOGREL BISULFATE 75 MG: 75 TABLET ORAL at 09:08

## 2023-09-15 RX ADMIN — AMLODIPINE BESYLATE 10 MG: 10 TABLET ORAL at 14:00

## 2023-09-15 RX ADMIN — ASPIRIN 81 MG 81 MG: 81 TABLET ORAL at 09:08

## 2023-09-15 RX ADMIN — INSULIN LISPRO 2 UNITS: 100 INJECTION, SOLUTION INTRAVENOUS; SUBCUTANEOUS at 14:00

## 2023-09-15 ASSESSMENT — PAIN SCALES - GENERAL
PAINLEVEL_OUTOF10: 0

## 2023-09-15 NOTE — PLAN OF CARE
visually impaired, hearing impaired and aphasic patients to use assistive/communication devices     Problem: Skin/Tissue Integrity - Adult  Goal: Skin integrity remains intact  Outcome: Progressing  Flowsheets (Taken 9/15/2023 0240)  Skin Integrity Remains Intact: Monitor for areas of redness and/or skin breakdown     Problem: Musculoskeletal - Adult  Goal: Return mobility to safest level of function  Outcome: Progressing  Flowsheets (Taken 9/15/2023 0240)  Return Mobility to Safest Level of Function:   Assess patient stability and activity tolerance for standing, transferring and ambulating with or without assistive devices   Assist with transfers and ambulation using safe patient handling equipment as needed   Ensure adequate protection for wounds/incisions during mobilization   Obtain physical therapy/occupational therapy consults as needed   Instruct patient/family in ordered activity level     Problem: Musculoskeletal - Adult  Goal: Return ADL status to a safe level of function  Outcome: Progressing  Flowsheets (Taken 9/15/2023 0240)  Return ADL Status to a Safe Level of Function:   Administer medication as ordered   Assess activities of daily living deficits and provide assistive devices as needed   Obtain physical therapy/occupational therapy consults as needed   Assist and instruct patient to increase activity and self care as tolerated     Problem: Chronic Conditions and Co-morbidities  Goal: Patient's chronic conditions and co-morbidity symptoms are monitored and maintained or improved  Outcome: Progressing  Flowsheets (Taken 9/15/2023 0240)  Care Plan - Patient's Chronic Conditions and Co-Morbidity Symptoms are Monitored and Maintained or Improved:   Monitor and assess patient's chronic conditions and comorbid symptoms for stability, deterioration, or improvement   Collaborate with multidisciplinary team to address chronic and comorbid conditions and prevent exacerbation or deterioration   Update acute care plan with appropriate goals if chronic or comorbid symptoms are exacerbated and prevent overall improvement and discharge     Care plan reviewed with patient. Patient verbalize understanding of the plan of care and contributes to goal setting. no

## 2023-09-15 NOTE — NURSE NAVIGATOR
Neuro Nurse Navigator Follow Up    This RN visited with pt in room. Pt laying on cot watching tv at time of visit. [place] education was re enforced. This RN was able to answer pt questions and pt verbalized understanding. negative...

## 2023-09-15 NOTE — PLAN OF CARE
Problem: Discharge Planning  Goal: Discharge to home or other facility with appropriate resources  9/15/2023 1702 by Sharon Castellano RN  Outcome: Adequate for Discharge  9/15/2023 1702 by Sharon Castellano RN  Outcome: Adequate for Discharge     Problem: Pain  Goal: Verbalizes/displays adequate comfort level or baseline comfort level  9/15/2023 1702 by Sharon Castellano RN  Outcome: Adequate for Discharge  9/15/2023 1702 by Sharon Castellano RN  Outcome: Adequate for Discharge     Problem: Safety - Adult  Goal: Free from fall injury  9/15/2023 1702 by Sharon Castellano RN  Outcome: Adequate for Discharge  9/15/2023 1702 by Sharon Castellano RN  Outcome: Adequate for Discharge     Problem: Neurosensory - Adult  Goal: Achieves stable or improved neurological status  9/15/2023 1702 by Sharon Castellano RN  Outcome: Adequate for Discharge  9/15/2023 1702 by Sharon Castellano RN  Outcome: Adequate for Discharge  Goal: Absence of seizures  9/15/2023 1702 by Sharon Castellano RN  Outcome: Adequate for Discharge  9/15/2023 1702 by Sharon Castellano RN  Outcome: Adequate for Discharge  Goal: Remains free of injury related to seizures activity  9/15/2023 1702 by Sharon Castellano RN  Outcome: Adequate for Discharge  9/15/2023 1702 by Sharon Castellano RN  Outcome: Adequate for Discharge  Goal: Achieves maximal functionality and self care  9/15/2023 1702 by Sharon Castellano RN  Outcome: Adequate for Discharge  9/15/2023 1702 by Sharon Castellano RN  Outcome: Adequate for Discharge     Problem: Skin/Tissue Integrity - Adult  Goal: Skin integrity remains intact  9/15/2023 1702 by Sharon Castellano RN  Outcome: Adequate for Discharge  9/15/2023 1702 by Sharon Castellano RN  Outcome: Adequate for Discharge     Problem: Musculoskeletal - Adult  Goal: Return mobility to safest level of function  9/15/2023 1702 by Sharon Castellano RN  Outcome: Adequate for Discharge  9/15/2023 1702 by Sharon Castellano RN  Outcome: Adequate for Discharge  Goal: Return ADL status to a safe level of function  9/15/2023 1702 by Sharon Castellano RN  Outcome: Adequate for Discharge  9/15/2023 1702 by Sharon Castellano RN  Outcome: Adequate for Discharge     Problem: Chronic Conditions and Co-morbidities  Goal: Patient's chronic conditions and co-morbidity symptoms are monitored and maintained or improved  9/15/2023 1702 by Sharon Castellano RN  Outcome: Adequate for Discharge  9/15/2023 1702 by Sharon Castellano RN  Outcome: Adequate for Discharge     Problem: Nutrition Deficit:  Goal: Optimize nutritional status  9/15/2023 1702 by Sharon Castellano RN  Outcome: Adequate for Discharge  9/15/2023 1702 by Sharon Castellano RN  Outcome: Adequate for Discharge

## 2023-09-15 NOTE — PROGRESS NOTES
Comprehensive Nutrition Assessment    Type and Reason for Visit:  Initial, Positive Nutrition Screen (poor oral intake, unplanned weight loss)    Nutrition Recommendations/Plan:   Consider MVI. ONS: Glucerna TID. Continue current diet. Patient declined any diet education needs. Malnutrition Assessment:  Malnutrition Status: At risk for malnutrition (Comment) (09/15/23 1005)    Context:  Acute Illness     Findings of the 6 clinical characteristics of malnutrition:  Energy Intake:  Mild decrease in energy intake (Comment)  Weight Loss:  Unable to assess (hard to assess as no actual EMR weights prior to admit)     Body Fat Loss:  No significant body fat loss     Muscle Mass Loss:  No significant muscle mass loss    Fluid Accumulation:  No significant fluid accumulation     Strength:  Not Performed    Nutrition Assessment:     Pt. nutritionally compromised AEB reports of poor appetite/intake prior to admit. At risk for further nutrition compromise r/t admit with right sided weakness, CVA, vertebral artery stenosis, polysubstance abuse,  and underlying medical condition (hx: HTN, DM). Nutrition Related Findings:    Pt. Report/Treatments/Miscellaneous: Patient seen earlier this morning eating breakfast. Reports poor appetite/intake for ~ 1 week prior to admit. States it is getting better. He was unsure about weight loss. Drinking Glucerna shakes. States he tries to adhere to diabetic, low sodium, heart healthy diet, declined any diet questions. GI Status:  BM 9/13/23  Pertinent Labs: 9/13/23: HgbA1c 8%/180 average glucose. 9/14/23: Sodium 136, Potassium 3.1, BUN 18, Creatinine 1.1, Mg 1.8, Glucose 156, positive drug scree-cocaine, marijuana, Cholesterol 184, HDL 44,,   Pertinent Meds: lipitor, humalog     Wound Type: None       Current Nutrition Intake & Therapies:    Average Meal Intake:  (not recorded)  Average Supplements Intake:  (drinking per patient)  ADULT DIET;  Regular; 4 carb

## 2023-09-15 NOTE — PROGRESS NOTES
[facility]  INPATIENT PHYSICAL THERAPY  DAILY NOTE  [facility] 4A - [id number]      Time In: 2261  Time Out: 1139  Timed Code Treatment Minutes: 26 Minutes  Minutes: 26          Date: 9/15/2023  Patient Name: Pancho Vázquez,  Gender:  male        MRN: [id number]  : 1955  (79 y.o.)     Referring Practitioner: Eliud Rossi CNP  Diagnosis: right sided weakness  Additional Pertinent Hx: Isabel Alfredo is a 72-year-old -American male presented to [facility] ER 2023 with chief complaint of right lower extremity right upper extremity weakness. Patient has a past medical history significant for current everyday smoker, asthma, essential hypertension, GERD, DMT2,  polysubstance use. Patient identifies use of cocaine within the last 24 hours. Patient recently moved to [place] [facility] in the last 4 to 6 weeks. Patient has no established PCP. Eagle Xiong presented from home, by private vehicle for evaluation of RUE and RLE weakness s/p fall from standing while moving from the bathroom to his bedroom around 4 AM this morning. Patient states that he felt weak and unsteady in both of his legs, causing him to fall. Last known well 2300 2023 when he went to bed. Patient states he felt weak and fell onto his right side without head trauma, LOC, or back pain. Assumed that the symptoms would resolve but when they had not this evening, he sought treatment and evaluation. MRI-1. Acute infarct left frontal lobe in the left LULU territory. 2. Atrophy and chronic small-vessel ischemic changes. 3. Multifocal chronic infarcts. CTA of neck-Mild-to-moderate disease bilateral carotid bulbs without significant   stenosis. 80% stenosis origin right vertebral artery.      Prior Level of Function:  Lives With: Friend(s)  Type of Home: House  Home Layout: Able to Live on Main level with bedroom/bathroom  Home Access: Stairs to enter with rails  Entrance Stairs - Number of Measures: Completed  [facility] Inpatient Mobility Raw Score : 19  AM-PAC Inpatient T-Scale Score : 45.44    ASSESSMENT:  Assessment:  pt impulsive and unsteady w/ initial mobility however did improve as session progressed- pt would benefit from cont skilled therapy   Activity Tolerance:  Patient tolerance of  treatment: fair. Equipment Recommendations:Equipment Needed: No  Discharge Recommendations:  anticipate home with assist as needed and OP therapy to follow    Plan: Current Treatment Recommendations: Strengthening, Balance training, Functional mobility training, Gait training, Stair training, Transfer training, Patient/Caregiver education & training, Safety education & training, Home exercise program, Therapeutic activities  General Plan:  (8-8X N)    Patient Education  Patient Education: Plan of Care, safety concerns w/ return home     Goals:  Patient Goals : go home  Short Term Goals  Time Frame for Short Term Goals: by discharge  Short Term Goal 1: bed mobility with MOD I to get in/out of bed  Short Term Goal 2: transfer with MOD I to get in/out of chairs  Short Term Goal 3: amb >150'x1 without AD and MOD I to walk safely in home  Short Term Goal 4: negotiate 2 steps with HR and MOD I to enter home safely  Long Term Goals  Time Frame for Long Term Goals : no LTGs set secondary to short ELOS    Following session, patient left in safe position with all fall risk precautions in place.

## 2023-09-15 NOTE — PROGRESS NOTES
Pharmacy Medication History Note      List of current medications patient is taking is complete. Source of information: patient/pharmacy dispense report     Changes made to medication list:  Medications removed (include reason, ex. therapy complete or physician discontinued):  Amlodipine 5 mg- taking amlodipine-benazepril     Medications added/doses adjusted:  Amlodipine-benazepril 10/40 mg- take 1 capsule by mouth daily  Janumet 50/1000 mg- take 1 tablet by mouth daily    Other notes (ex. Recent course of antibiotics, Coumadin dosing):  Last fill for Janumet was 2 tablets by mouth daily, patient stated he was on 1 tablet daily  Denies use of other OTC or herbal medications.       Allergies reviewed      Electronically signed by Ricardo Stewart on 9/15/2023 at 10:06 AM

## 2023-09-15 NOTE — PROGRESS NOTES
Discharge teaching and instructions for diagnosis/procedure of CVA completed with patient using teachback method. AVS reviewed. Cardiac event monitor in place. Patient voiced understanding regarding prescriptions, follow up appointments, and care of self at home. Discharged in a wheelchair to  home with support per family.

## 2023-09-15 NOTE — CARE COORDINATION
9/15/23, 2:19 PM EDT    DISCHARGE ON GOING EVALUATION    [facility] day: 2  Location: -19/019-A Reason for admit: Right sided weakness [R53.1]  Fall from standing, initial encounter [W19. XXXA]  Vertebral artery stenosis, right [I65.01]   Procedure:   9/13 CXR:   No acute cardiopulmonary disease. Cardiomegaly. 9/13 MRI brain:   1. Acute infarct left frontal lobe in the left LULU territory. 2. Atrophy and chronic small-vessel ischemic changes. 3. Multifocal chronic infarcts as described above. 9/13 CTA head and neck:   Mild-to-moderate disease bilateral carotid bulbs without significant   stenosis. 80% stenosis origin right vertebral artery. No aneurysm or occlusion. 9/13 CT head: Moderate nonspecific periventricular and deep white matter disease. 9/14 Echo: EF 40-45%  Barriers to Discharge: Neurology following. PT/OT/SLP, diabetes management, electrolyte replacement protocols, Norvasc, Asa, Lipitor, Plavix, Lovenox, Zofran. PCP: ENOC Baer CNP  Readmission Risk Score: 10.8%  Patient Goals/Plan/Treatment Preferences: Plans home with friend, possible new HH. SW consult in place. Will follow.

## 2023-09-15 NOTE — PROGRESS NOTES
[address] [place] [address]  Occupational Therapy  Daily Note  Time:   Time In: 7038  Time Out: 4371  Timed Code Treatment Minutes: 39 Minutes  Minutes: 39          Date: 9/15/2023  Patient Name: Chago Arevalo,   Gender: male      Room: [facility]19/019-A  MRN: [id number]  : 1955  (79 y.o.)  Referring Practitioner: Shell Matt, ENOC - CNP  Diagnosis: Right sided weakness  Additional Pertinent Hx: Kaley Calderon is a 57-year-old -American male presented to [facility] ER 2023 with chief complaint of right lower extremity right upper extremity weakness. Patient has a past medical history significant for current everyday smoker, asthma, essential hypertension, GERD, DMT2,  polysubstance use. Patient identifies use of cocaine within the last 24 hours. Patient recently moved to  [facility] in the last 4 to 6 weeks. Patient has no established PCP. Orville Rea presented from home, by private vehicle for evaluation of RUE and RLE weakness s/p fall from standing while moving from the bathroom to his bedroom around 4 AM this morning. Patient states that he felt weak and unsteady in both of his legs, causing him to fall. Last known well 2300 2023 when he went to bed. Patient states he felt weak and fell onto his right side without head trauma, LOC, or back pain. Assumed that the symptoms would resolve but when they had not this evening, he sought treatment and evaluation. MRI-1. Acute infarct left frontal lobe in the left LULU territory. 2. Atrophy and chronic small-vessel ischemic changes. 3. Multifocal chronic infarcts. CTA of neck-Mild-to-moderate disease bilateral carotid bulbs without significant   stenosis. 80% stenosis origin right vertebral artery. Restrictions/Precautions:  Restrictions/Precautions: Fall Risk     SUBJECTIVE: RN ok'ed session. Pt sitting in bedside chair upon arrival. Pt pleasant and agreeable to OT session.  Pt states

## 2023-09-15 NOTE — FLOWSHEET NOTE
09/15/23 1356   Safe Environment   Safety Measures Call light within reach  (Virtual Nurse Safety Round Complete)     Call placed to patients room, patient responds to audio, permitted video. Patient alert and oriented. Patient was talking about his life story and his diagnosis and how he needs a fresh pair of clothes to leave the hospital in. Mayda Sierra called to asked if they wash patients clothes, the answer was no, patient made aware. Patient educated on danetteizig call light. Call light within reach, no signs or symtpoms of distress noted.

## 2024-07-31 ENCOUNTER — HOSPITAL ENCOUNTER (INPATIENT)
Age: 69
LOS: 4 days | Discharge: HOME | DRG: 291 | End: 2024-08-05
Attending: STUDENT IN AN ORGANIZED HEALTH CARE EDUCATION/TRAINING PROGRAM | Admitting: STUDENT IN AN ORGANIZED HEALTH CARE EDUCATION/TRAINING PROGRAM
Payer: COMMERCIAL

## 2024-07-31 ENCOUNTER — APPOINTMENT (OUTPATIENT)
Dept: GENERAL RADIOLOGY | Age: 69
DRG: 291 | End: 2024-07-31
Payer: COMMERCIAL

## 2024-07-31 DIAGNOSIS — N17.9 AKI (ACUTE KIDNEY INJURY) (HCC): ICD-10-CM

## 2024-07-31 DIAGNOSIS — R06.02 SHORTNESS OF BREATH: ICD-10-CM

## 2024-07-31 DIAGNOSIS — I50.9 ACUTE CONGESTIVE HEART FAILURE, UNSPECIFIED HEART FAILURE TYPE (HCC): Primary | ICD-10-CM

## 2024-07-31 DIAGNOSIS — R53.1 WEAKNESS: ICD-10-CM

## 2024-07-31 LAB
ANION GAP SERPL CALC-SCNC: 15 MEQ/L (ref 8–16)
BASE EXCESS BLDA CALC-SCNC: 2 MMOL/L (ref -2–3)
BASOPHILS ABSOLUTE: 0 THOU/MM3 (ref 0–0.1)
BASOPHILS NFR BLD AUTO: 0.9 %
BUN SERPL-MCNC: 41 MG/DL (ref 7–22)
CALCIUM SERPL-MCNC: 8.4 MG/DL (ref 8.5–10.5)
CHLORIDE SERPL-SCNC: 96 MEQ/L (ref 98–111)
CO2 SERPL-SCNC: 25 MEQ/L (ref 23–33)
COLLECTED BY:: ABNORMAL
CREAT SERPL-MCNC: 1.8 MG/DL (ref 0.4–1.2)
DEPRECATED RDW RBC AUTO: 56.8 FL (ref 35–45)
DEVICE: ABNORMAL
EOSINOPHIL NFR BLD AUTO: 3.9 %
EOSINOPHILS ABSOLUTE: 0.2 THOU/MM3 (ref 0–0.4)
ERYTHROCYTE [DISTWIDTH] IN BLOOD BY AUTOMATED COUNT: 16.5 % (ref 11.5–14.5)
GFR SERPL CREATININE-BSD FRML MDRD: 41 ML/MIN/1.73M2
GLUCOSE SERPL-MCNC: 184 MG/DL (ref 70–108)
HCO3 BLDA-SCNC: 28 MMOL/L (ref 23–28)
HCT VFR BLD AUTO: 37.9 % (ref 42–52)
HGB BLD-MCNC: 12.5 GM/DL (ref 14–18)
IMM GRANULOCYTES # BLD AUTO: 0.01 THOU/MM3 (ref 0–0.07)
IMM GRANULOCYTES NFR BLD AUTO: 0.2 %
LYMPHOCYTES ABSOLUTE: 1.4 THOU/MM3 (ref 1–4.8)
LYMPHOCYTES NFR BLD AUTO: 25 %
MCH RBC QN AUTO: 30.9 PG (ref 26–33)
MCHC RBC AUTO-ENTMCNC: 33 GM/DL (ref 32.2–35.5)
MCV RBC AUTO: 93.8 FL (ref 80–94)
MONOCYTES ABSOLUTE: 0.6 THOU/MM3 (ref 0.4–1.3)
MONOCYTES NFR BLD AUTO: 11 %
NEUTROPHILS ABSOLUTE: 3.2 THOU/MM3 (ref 1.8–7.7)
NEUTROPHILS NFR BLD AUTO: 59 %
NRBC BLD AUTO-RTO: 0 /100 WBC
NT-PROBNP SERPL IA-MCNC: ABNORMAL PG/ML (ref 0–124)
OSMOLALITY SERPL CALC.SUM OF ELEC: 286.8 MOSMOL/KG (ref 275–300)
PCO2 TEMP ADJ BLDMV: 47 MMHG (ref 41–51)
PH BLDMV: 7.38 [PH] (ref 7.31–7.41)
PLATELET # BLD AUTO: 218 THOU/MM3 (ref 130–400)
PMV BLD AUTO: 11.5 FL (ref 9.4–12.4)
PO2 BLDMV: 23 MMHG (ref 25–40)
POTASSIUM SERPL-SCNC: 4.3 MEQ/L (ref 3.5–5.2)
RBC # BLD AUTO: 4.04 MILL/MM3 (ref 4.7–6.1)
SAO2 % BLDMV: 38 %
SITE: ABNORMAL
SODIUM SERPL-SCNC: 136 MEQ/L (ref 135–145)
TROPONIN, HIGH SENSITIVITY: 33 NG/L (ref 0–12)
VENTILATION MODE VENT: ABNORMAL
WBC # BLD AUTO: 5.5 THOU/MM3 (ref 4.8–10.8)

## 2024-07-31 PROCEDURE — 83036 HEMOGLOBIN GLYCOSYLATED A1C: CPT

## 2024-07-31 PROCEDURE — 93005 ELECTROCARDIOGRAM TRACING: CPT | Performed by: STUDENT IN AN ORGANIZED HEALTH CARE EDUCATION/TRAINING PROGRAM

## 2024-07-31 PROCEDURE — 83880 ASSAY OF NATRIURETIC PEPTIDE: CPT

## 2024-07-31 PROCEDURE — 71045 X-RAY EXAM CHEST 1 VIEW: CPT

## 2024-07-31 PROCEDURE — 80048 BASIC METABOLIC PNL TOTAL CA: CPT

## 2024-07-31 PROCEDURE — 84484 ASSAY OF TROPONIN QUANT: CPT

## 2024-07-31 PROCEDURE — 82803 BLOOD GASES ANY COMBINATION: CPT

## 2024-07-31 PROCEDURE — 85025 COMPLETE CBC W/AUTO DIFF WBC: CPT

## 2024-07-31 PROCEDURE — 99285 EMERGENCY DEPT VISIT HI MDM: CPT

## 2024-07-31 PROCEDURE — 99223 1ST HOSP IP/OBS HIGH 75: CPT

## 2024-07-31 PROCEDURE — 36415 COLL VENOUS BLD VENIPUNCTURE: CPT

## 2024-07-31 RX ORDER — DAPAGLIFLOZIN 10 MG/1
10 TABLET, FILM COATED ORAL EVERY MORNING
COMMUNITY

## 2024-07-31 RX ORDER — FUROSEMIDE 10 MG/ML
40 INJECTION INTRAMUSCULAR; INTRAVENOUS ONCE
Status: COMPLETED | OUTPATIENT
Start: 2024-08-01 | End: 2024-08-01

## 2024-07-31 RX ORDER — SPIRONOLACTONE 25 MG/1
25 TABLET ORAL DAILY
COMMUNITY

## 2024-07-31 ASSESSMENT — PAIN - FUNCTIONAL ASSESSMENT
PAIN_FUNCTIONAL_ASSESSMENT: NONE - DENIES PAIN
PAIN_FUNCTIONAL_ASSESSMENT: 0-10

## 2024-07-31 ASSESSMENT — PAIN SCALES - GENERAL: PAINLEVEL_OUTOF10: 0

## 2024-08-01 ENCOUNTER — APPOINTMENT (OUTPATIENT)
Dept: ULTRASOUND IMAGING | Age: 69
DRG: 291 | End: 2024-08-01
Payer: COMMERCIAL

## 2024-08-01 ENCOUNTER — APPOINTMENT (OUTPATIENT)
Age: 69
DRG: 291 | End: 2024-08-01
Payer: COMMERCIAL

## 2024-08-01 PROBLEM — I50.21 ACUTE SYSTOLIC HEART FAILURE (HCC): Status: ACTIVE | Noted: 2024-08-01

## 2024-08-01 PROBLEM — R06.02 SHORTNESS OF BREATH: Status: ACTIVE | Noted: 2024-08-01

## 2024-08-01 PROBLEM — I44.0 FIRST DEGREE AV BLOCK: Status: ACTIVE | Noted: 2024-08-01

## 2024-08-01 PROBLEM — I50.9 ACUTE CONGESTIVE HEART FAILURE (HCC): Status: ACTIVE | Noted: 2024-08-01

## 2024-08-01 LAB
AMPHETAMINES UR QL SCN: NEGATIVE
ANION GAP SERPL CALC-SCNC: 13 MEQ/L (ref 8–16)
BARBITURATES UR QL SCN: NEGATIVE
BENZODIAZ UR QL SCN: NEGATIVE
BUN SERPL-MCNC: 43 MG/DL (ref 7–22)
BZE UR QL SCN: NEGATIVE
CA-I BLD ISE-SCNC: 0.92 MMOL/L (ref 1.12–1.32)
CALCIUM SERPL-MCNC: 8.5 MG/DL (ref 8.5–10.5)
CANNABINOIDS UR QL SCN: POSITIVE
CHLORIDE SERPL-SCNC: 98 MEQ/L (ref 98–111)
CO2 SERPL-SCNC: 26 MEQ/L (ref 23–33)
CREAT SERPL-MCNC: 1.6 MG/DL (ref 0.4–1.2)
DEPRECATED MEAN GLUCOSE BLD GHB EST-ACNC: 156 MG/DL (ref 70–126)
ECHO AO ASC DIAM: 3.8 CM
ECHO AO ASCENDING AORTA INDEX: 1.91 CM/M2
ECHO AV CUSP MM: 2.1 CM
ECHO AV PEAK GRADIENT: 3 MMHG
ECHO AV PEAK VELOCITY: 0.9 M/S
ECHO AV VELOCITY RATIO: 0.67
ECHO BSA: 2.01 M2
ECHO EST RA PRESSURE: 10 MMHG
ECHO LA AREA 2C: 22.7 CM2
ECHO LA AREA 4C: 21.1 CM2
ECHO LA DIAMETER INDEX: 1.71 CM/M2
ECHO LA DIAMETER: 3.4 CM
ECHO LA MAJOR AXIS: 5.9 CM
ECHO LA MINOR AXIS: 6.2 CM
ECHO LA VOL BP: 68 ML (ref 18–58)
ECHO LA VOL MOD A2C: 70 ML (ref 18–58)
ECHO LA VOL MOD A4C: 63 ML (ref 18–58)
ECHO LA VOL/BSA BIPLANE: 34 ML/M2 (ref 16–34)
ECHO LA VOLUME INDEX MOD A2C: 35 ML/M2 (ref 16–34)
ECHO LA VOLUME INDEX MOD A4C: 32 ML/M2 (ref 16–34)
ECHO LV E' LATERAL VELOCITY: 4 CM/S
ECHO LV E' SEPTAL VELOCITY: 3 CM/S
ECHO LV FRACTIONAL SHORTENING: 6 % (ref 28–44)
ECHO LV INTERNAL DIMENSION DIASTOLE INDEX: 2.71 CM/M2
ECHO LV INTERNAL DIMENSION DIASTOLIC: 5.4 CM (ref 4.2–5.9)
ECHO LV INTERNAL DIMENSION SYSTOLIC INDEX: 2.56 CM/M2
ECHO LV INTERNAL DIMENSION SYSTOLIC: 5.1 CM
ECHO LV ISOVOLUMETRIC RELAXATION TIME (IVRT): 137 MS
ECHO LV IVSD: 1.5 CM (ref 0.6–1)
ECHO LV MASS 2D: 362.7 G (ref 88–224)
ECHO LV MASS INDEX 2D: 182.3 G/M2 (ref 49–115)
ECHO LV POSTERIOR WALL DIASTOLIC: 1.5 CM (ref 0.6–1)
ECHO LV RELATIVE WALL THICKNESS RATIO: 0.56
ECHO LVOT PEAK GRADIENT: 1 MMHG
ECHO LVOT PEAK VELOCITY: 0.6 M/S
ECHO MV A VELOCITY: 0.38 M/S
ECHO MV E DECELERATION TIME (DT): 220 MS
ECHO MV E VELOCITY: 0.79 M/S
ECHO MV E/A RATIO: 2.08
ECHO MV E/E' LATERAL: 19.75
ECHO MV E/E' RATIO (AVERAGED): 23.04
ECHO MV E/E' SEPTAL: 26.33
ECHO MV REGURGITANT PEAK GRADIENT: 81 MMHG
ECHO MV REGURGITANT PEAK VELOCITY: 4.5 M/S
ECHO PULMONARY ARTERY END DIASTOLIC PRESSURE: 12 MMHG
ECHO PV MAX VELOCITY: 0.6 M/S
ECHO PV PEAK GRADIENT: 1 MMHG
ECHO PV REGURGITANT MAX VELOCITY: 1.8 M/S
ECHO RIGHT VENTRICULAR SYSTOLIC PRESSURE (RVSP): 48 MMHG
ECHO RV INTERNAL DIMENSION: 4 CM
ECHO RV TAPSE: 0.8 CM (ref 1.7–?)
ECHO TV E WAVE: 0.7 M/S
ECHO TV REGURGITANT MAX VELOCITY: 3.07 M/S
ECHO TV REGURGITANT PEAK GRADIENT: 38 MMHG
EKG ATRIAL RATE: 84 BPM
EKG P AXIS: 71 DEGREES
EKG P-R INTERVAL: 222 MS
EKG Q-T INTERVAL: 424 MS
EKG QRS DURATION: 92 MS
EKG QTC CALCULATION (BAZETT): 501 MS
EKG R AXIS: -45 DEGREES
EKG T AXIS: 61 DEGREES
EKG VENTRICULAR RATE: 84 BPM
FENTANYL: NEGATIVE
GFR SERPL CREATININE-BSD FRML MDRD: 47 ML/MIN/1.73M2
GLUCOSE BLD STRIP.AUTO-MCNC: 141 MG/DL (ref 70–108)
GLUCOSE BLD STRIP.AUTO-MCNC: 152 MG/DL (ref 70–108)
GLUCOSE BLD STRIP.AUTO-MCNC: 168 MG/DL (ref 70–108)
GLUCOSE BLD STRIP.AUTO-MCNC: 224 MG/DL (ref 70–108)
GLUCOSE SERPL-MCNC: 142 MG/DL (ref 70–108)
HBA1C MFR BLD HPLC: 7.2 % (ref 4.4–6.4)
LACTATE SERPL-SCNC: 1.6 MMOL/L (ref 0.5–2)
LACTATE SERPL-SCNC: 1.8 MMOL/L (ref 0.5–2)
LACTATE SERPL-SCNC: 3.1 MMOL/L (ref 0.5–2)
LACTATE SERPL-SCNC: 3.7 MMOL/L (ref 0.5–2)
MAGNESIUM SERPL-MCNC: 2.1 MG/DL (ref 1.6–2.4)
MRSA DNA SPEC QL NAA+PROBE: NEGATIVE
OPIATES UR QL SCN: NEGATIVE
OXYCODONE: NEGATIVE
PCP UR QL SCN: NEGATIVE
POTASSIUM SERPL-SCNC: 4.3 MEQ/L (ref 3.5–5.2)
SODIUM SERPL-SCNC: 137 MEQ/L (ref 135–145)
TROPONIN, HIGH SENSITIVITY: 27 NG/L (ref 0–12)
TROPONIN, HIGH SENSITIVITY: 29 NG/L (ref 0–12)

## 2024-08-01 PROCEDURE — 82948 REAGENT STRIP/BLOOD GLUCOSE: CPT

## 2024-08-01 PROCEDURE — 83605 ASSAY OF LACTIC ACID: CPT

## 2024-08-01 PROCEDURE — 93306 TTE W/DOPPLER COMPLETE: CPT | Performed by: INTERNAL MEDICINE

## 2024-08-01 PROCEDURE — 2580000003 HC RX 258

## 2024-08-01 PROCEDURE — 6370000000 HC RX 637 (ALT 250 FOR IP)

## 2024-08-01 PROCEDURE — 99223 1ST HOSP IP/OBS HIGH 75: CPT | Performed by: INTERNAL MEDICINE

## 2024-08-01 PROCEDURE — 80307 DRUG TEST PRSMV CHEM ANLYZR: CPT

## 2024-08-01 PROCEDURE — 6360000002 HC RX W HCPCS

## 2024-08-01 PROCEDURE — 76770 US EXAM ABDO BACK WALL COMP: CPT

## 2024-08-01 PROCEDURE — 93306 TTE W/DOPPLER COMPLETE: CPT

## 2024-08-01 PROCEDURE — 99233 SBSQ HOSP IP/OBS HIGH 50: CPT | Performed by: STUDENT IN AN ORGANIZED HEALTH CARE EDUCATION/TRAINING PROGRAM

## 2024-08-01 PROCEDURE — 87641 MR-STAPH DNA AMP PROBE: CPT

## 2024-08-01 PROCEDURE — 6360000002 HC RX W HCPCS: Performed by: STUDENT IN AN ORGANIZED HEALTH CARE EDUCATION/TRAINING PROGRAM

## 2024-08-01 PROCEDURE — 2140000000 HC CCU INTERMEDIATE R&B

## 2024-08-01 PROCEDURE — 2500000003 HC RX 250 WO HCPCS

## 2024-08-01 PROCEDURE — 82330 ASSAY OF CALCIUM: CPT

## 2024-08-01 PROCEDURE — 99222 1ST HOSP IP/OBS MODERATE 55: CPT | Performed by: INTERNAL MEDICINE

## 2024-08-01 PROCEDURE — 84484 ASSAY OF TROPONIN QUANT: CPT

## 2024-08-01 PROCEDURE — 6370000000 HC RX 637 (ALT 250 FOR IP): Performed by: STUDENT IN AN ORGANIZED HEALTH CARE EDUCATION/TRAINING PROGRAM

## 2024-08-01 PROCEDURE — 36415 COLL VENOUS BLD VENIPUNCTURE: CPT

## 2024-08-01 PROCEDURE — 80048 BASIC METABOLIC PNL TOTAL CA: CPT

## 2024-08-01 PROCEDURE — 83735 ASSAY OF MAGNESIUM: CPT

## 2024-08-01 PROCEDURE — 93010 ELECTROCARDIOGRAM REPORT: CPT | Performed by: INTERNAL MEDICINE

## 2024-08-01 PROCEDURE — 87040 BLOOD CULTURE FOR BACTERIA: CPT

## 2024-08-01 RX ORDER — ACETAMINOPHEN 325 MG/1
650 TABLET ORAL EVERY 6 HOURS PRN
Status: DISCONTINUED | OUTPATIENT
Start: 2024-08-01 | End: 2024-08-05 | Stop reason: HOSPADM

## 2024-08-01 RX ORDER — ENOXAPARIN SODIUM 100 MG/ML
40 INJECTION SUBCUTANEOUS DAILY
Status: DISCONTINUED | OUTPATIENT
Start: 2024-08-01 | End: 2024-08-05 | Stop reason: HOSPADM

## 2024-08-01 RX ORDER — ACETAMINOPHEN 650 MG/1
650 SUPPOSITORY RECTAL EVERY 6 HOURS PRN
Status: DISCONTINUED | OUTPATIENT
Start: 2024-08-01 | End: 2024-08-05 | Stop reason: HOSPADM

## 2024-08-01 RX ORDER — CARVEDILOL 12.5 MG/1
12.5 TABLET ORAL 2 TIMES DAILY WITH MEALS
COMMUNITY
Start: 2024-07-29

## 2024-08-01 RX ORDER — ASPIRIN 81 MG/1
81 TABLET, CHEWABLE ORAL DAILY
Status: DISCONTINUED | OUTPATIENT
Start: 2024-08-01 | End: 2024-08-05 | Stop reason: HOSPADM

## 2024-08-01 RX ORDER — POTASSIUM CHLORIDE 7.45 MG/ML
10 INJECTION INTRAVENOUS PRN
Status: DISCONTINUED | OUTPATIENT
Start: 2024-08-01 | End: 2024-08-05 | Stop reason: HOSPADM

## 2024-08-01 RX ORDER — SODIUM CHLORIDE 0.9 % (FLUSH) 0.9 %
5-40 SYRINGE (ML) INJECTION PRN
Status: DISCONTINUED | OUTPATIENT
Start: 2024-08-01 | End: 2024-08-05 | Stop reason: HOSPADM

## 2024-08-01 RX ORDER — DEXTROSE MONOHYDRATE 100 MG/ML
INJECTION, SOLUTION INTRAVENOUS CONTINUOUS PRN
Status: DISCONTINUED | OUTPATIENT
Start: 2024-08-01 | End: 2024-08-05 | Stop reason: HOSPADM

## 2024-08-01 RX ORDER — SODIUM CHLORIDE 9 MG/ML
INJECTION, SOLUTION INTRAVENOUS PRN
Status: DISCONTINUED | OUTPATIENT
Start: 2024-08-01 | End: 2024-08-05 | Stop reason: HOSPADM

## 2024-08-01 RX ORDER — POLYETHYLENE GLYCOL 3350 17 G/17G
17 POWDER, FOR SOLUTION ORAL DAILY PRN
Status: DISCONTINUED | OUTPATIENT
Start: 2024-08-01 | End: 2024-08-05 | Stop reason: HOSPADM

## 2024-08-01 RX ORDER — BUMETANIDE 0.25 MG/ML
1 INJECTION INTRAMUSCULAR; INTRAVENOUS 2 TIMES DAILY
Status: DISCONTINUED | OUTPATIENT
Start: 2024-08-01 | End: 2024-08-03

## 2024-08-01 RX ORDER — ONDANSETRON 2 MG/ML
4 INJECTION INTRAMUSCULAR; INTRAVENOUS EVERY 6 HOURS PRN
Status: DISCONTINUED | OUTPATIENT
Start: 2024-08-01 | End: 2024-08-05 | Stop reason: HOSPADM

## 2024-08-01 RX ORDER — CLOPIDOGREL BISULFATE 75 MG/1
75 TABLET ORAL DAILY
Status: DISCONTINUED | OUTPATIENT
Start: 2024-08-02 | End: 2024-08-05 | Stop reason: HOSPADM

## 2024-08-01 RX ORDER — CLOPIDOGREL BISULFATE 75 MG/1
75 TABLET ORAL DAILY
Status: ON HOLD | COMMUNITY
End: 2024-08-05 | Stop reason: HOSPADM

## 2024-08-01 RX ORDER — INSULIN LISPRO 100 [IU]/ML
0-4 INJECTION, SOLUTION INTRAVENOUS; SUBCUTANEOUS
Status: DISCONTINUED | OUTPATIENT
Start: 2024-08-01 | End: 2024-08-01

## 2024-08-01 RX ORDER — SPIRONOLACTONE 25 MG/1
25 TABLET ORAL DAILY
Status: DISCONTINUED | OUTPATIENT
Start: 2024-08-01 | End: 2024-08-05 | Stop reason: HOSPADM

## 2024-08-01 RX ORDER — ATORVASTATIN CALCIUM 80 MG/1
80 TABLET, FILM COATED ORAL NIGHTLY
Status: DISCONTINUED | OUTPATIENT
Start: 2024-08-01 | End: 2024-08-05 | Stop reason: HOSPADM

## 2024-08-01 RX ORDER — SODIUM CHLORIDE 0.9 % (FLUSH) 0.9 %
5-40 SYRINGE (ML) INJECTION EVERY 12 HOURS SCHEDULED
Status: DISCONTINUED | OUTPATIENT
Start: 2024-08-01 | End: 2024-08-05 | Stop reason: HOSPADM

## 2024-08-01 RX ORDER — ONDANSETRON 4 MG/1
4 TABLET, ORALLY DISINTEGRATING ORAL EVERY 8 HOURS PRN
Status: DISCONTINUED | OUTPATIENT
Start: 2024-08-01 | End: 2024-08-05 | Stop reason: HOSPADM

## 2024-08-01 RX ORDER — CLOPIDOGREL BISULFATE 75 MG/1
300 TABLET ORAL ONCE
Status: COMPLETED | OUTPATIENT
Start: 2024-08-01 | End: 2024-08-01

## 2024-08-01 RX ORDER — INSULIN LISPRO 100 [IU]/ML
0-8 INJECTION, SOLUTION INTRAVENOUS; SUBCUTANEOUS
Status: DISCONTINUED | OUTPATIENT
Start: 2024-08-01 | End: 2024-08-02

## 2024-08-01 RX ORDER — GLUCAGON 1 MG/ML
1 KIT INJECTION PRN
Status: DISCONTINUED | OUTPATIENT
Start: 2024-08-01 | End: 2024-08-05 | Stop reason: HOSPADM

## 2024-08-01 RX ORDER — MAGNESIUM SULFATE IN WATER 40 MG/ML
2000 INJECTION, SOLUTION INTRAVENOUS PRN
Status: DISCONTINUED | OUTPATIENT
Start: 2024-08-01 | End: 2024-08-05 | Stop reason: HOSPADM

## 2024-08-01 RX ORDER — CARVEDILOL 6.25 MG/1
12.5 TABLET ORAL 2 TIMES DAILY WITH MEALS
Status: DISCONTINUED | OUTPATIENT
Start: 2024-08-01 | End: 2024-08-05 | Stop reason: HOSPADM

## 2024-08-01 RX ORDER — INSULIN LISPRO 100 [IU]/ML
0-4 INJECTION, SOLUTION INTRAVENOUS; SUBCUTANEOUS NIGHTLY
Status: DISCONTINUED | OUTPATIENT
Start: 2024-08-01 | End: 2024-08-02

## 2024-08-01 RX ORDER — CALCIUM GLUCONATE 20 MG/ML
2000 INJECTION, SOLUTION INTRAVENOUS PRN
Status: DISCONTINUED | OUTPATIENT
Start: 2024-08-01 | End: 2024-08-05 | Stop reason: HOSPADM

## 2024-08-01 RX ORDER — INSULIN LISPRO 100 [IU]/ML
0-4 INJECTION, SOLUTION INTRAVENOUS; SUBCUTANEOUS NIGHTLY
Status: DISCONTINUED | OUTPATIENT
Start: 2024-08-01 | End: 2024-08-01

## 2024-08-01 RX ORDER — SACUBITRIL AND VALSARTAN 24; 26 MG/1; MG/1
1 TABLET, FILM COATED ORAL 2 TIMES DAILY
COMMUNITY
End: 2024-08-05 | Stop reason: HOSPADM

## 2024-08-01 RX ORDER — POTASSIUM CHLORIDE 750 MG/1
10 TABLET, EXTENDED RELEASE ORAL 2 TIMES DAILY WITH MEALS
COMMUNITY

## 2024-08-01 RX ORDER — POTASSIUM CHLORIDE 20 MEQ/1
40 TABLET, EXTENDED RELEASE ORAL PRN
Status: DISCONTINUED | OUTPATIENT
Start: 2024-08-01 | End: 2024-08-05 | Stop reason: HOSPADM

## 2024-08-01 RX ORDER — FUROSEMIDE 40 MG/1
40 TABLET ORAL 2 TIMES DAILY
Status: ON HOLD | COMMUNITY
Start: 2024-07-31 | End: 2024-08-05 | Stop reason: HOSPADM

## 2024-08-01 RX ADMIN — CARVEDILOL 12.5 MG: 6.25 TABLET, FILM COATED ORAL at 18:44

## 2024-08-01 RX ADMIN — CARVEDILOL 12.5 MG: 6.25 TABLET, FILM COATED ORAL at 10:03

## 2024-08-01 RX ADMIN — ENOXAPARIN SODIUM 40 MG: 100 INJECTION SUBCUTANEOUS at 13:06

## 2024-08-01 RX ADMIN — BUMETANIDE 1 MG: 0.25 INJECTION INTRAMUSCULAR; INTRAVENOUS at 18:44

## 2024-08-01 RX ADMIN — SODIUM CHLORIDE, PRESERVATIVE FREE 10 ML: 5 INJECTION INTRAVENOUS at 10:05

## 2024-08-01 RX ADMIN — CLOPIDOGREL BISULFATE 300 MG: 75 TABLET ORAL at 13:07

## 2024-08-01 RX ADMIN — BUMETANIDE 1 MG: 0.25 INJECTION INTRAMUSCULAR; INTRAVENOUS at 13:07

## 2024-08-01 RX ADMIN — FUROSEMIDE 40 MG: 10 INJECTION, SOLUTION INTRAMUSCULAR; INTRAVENOUS at 00:24

## 2024-08-01 RX ADMIN — ASPIRIN 81 MG 81 MG: 81 TABLET ORAL at 10:03

## 2024-08-01 RX ADMIN — SODIUM CHLORIDE, PRESERVATIVE FREE 10 ML: 5 INJECTION INTRAVENOUS at 21:02

## 2024-08-01 RX ADMIN — ATORVASTATIN CALCIUM 80 MG: 80 TABLET, FILM COATED ORAL at 21:02

## 2024-08-01 RX ADMIN — CEFTRIAXONE SODIUM 1000 MG: 1 INJECTION, POWDER, FOR SOLUTION INTRAMUSCULAR; INTRAVENOUS at 10:10

## 2024-08-01 RX ADMIN — CALCIUM GLUCONATE 2000 MG: 20 INJECTION, SOLUTION INTRAVENOUS at 13:16

## 2024-08-01 ASSESSMENT — PAIN SCALES - GENERAL
PAINLEVEL_OUTOF10: 0
PAINLEVEL_OUTOF10: 0

## 2024-08-01 ASSESSMENT — PAIN - FUNCTIONAL ASSESSMENT: PAIN_FUNCTIONAL_ASSESSMENT: NONE - DENIES PAIN

## 2024-08-01 NOTE — H&P
that he has not been diagnosed with CHF in the past, but upon doing med rec it was noted that the patient had a new prescription for entresto and Bumex, which raises suspicion for medical compliance.     Denies chest pain, nausea, vomiting, diarrhea, fevers, chills and rigors.     ED course: labs drawn, imaging obtained, Lasix 40 mg given in ED.    Review of Systems:   Pertinent positives and negatives as noted in the HPI. Otherwise complete ROS negative.    OBJECTIVE  Physical Exam:  BP (!) 143/102   Pulse 67   Temp 97.8 °F (36.6 °C) (Oral)   Resp 20   Ht 1.753 m (5' 9\")   Wt 81.6 kg (180 lb)   SpO2 97%   BMI 26.58 kg/m²     General appearance: Mild respiratory distress, appears stated age.  Eyes:  Pupils equal, round, and reactive to light. Conjunctivae/corneas clear.  HENT: Head normal in appearance. External nares normal.  Oral mucosa moist without lesions.  Hearing grossly intact.   Neck: Supple, with full range of motion. Trachea midline.  No gross JVD appreciated.  Respiratory:  Increased respiratory effort. Clear to auscultation, bilaterally without rales or wheezes or rhonchi.  Cardiovascular: Normal rate, regular rhythm with normal S1/S2 without murmurs.  +3 lower extremity edema.   Abdomen: Soft, non-tender, non-distended with normal bowel sounds.  Musculoskeletal: No joint swelling or tenderness. Normal tone. No abnormal movements.   Skin: Warm and dry. No rashes or lesions.  Neurologic:  No focal sensory/motor deficits in the upper and lower extremities. Cranial nerves:  grossly non-focal 2-12.     Psychiatric: Alert and oriented, normal insight and thought content.   Capillary Refill: Brisk,< 3 seconds.  Peripheral Pulses: +2 palpable, equal bilaterally.       Medications Prior to Admission:   Prior to Admission Medications   Prescriptions Last Dose Informant Patient Reported? Taking?   amLODIPine-benazepril (LOTREL) 10-40 MG per capsule   Yes No   Sig: Take 1 capsule by mouth daily   aspirin 81

## 2024-08-01 NOTE — PLAN OF CARE
Problem: Discharge Planning  Goal: Discharge to home or other facility with appropriate resources  8/1/2024 1137 by Ruma Toscano, RN  Outcome: Progressing  Flowsheets (Taken 8/1/2024 1137)  Discharge to home or other facility with appropriate resources:   Identify barriers to discharge with patient and caregiver   Identify discharge learning needs (meds, wound care, etc)   Arrange for needed discharge resources and transportation as appropriate     Problem: Skin/Tissue Integrity  Goal: Absence of new skin breakdown  Description: 1.  Monitor for areas of redness and/or skin breakdown  2.  Assess vascular access sites hourly  3.  Every 4-6 hours minimum:  Change oxygen saturation probe site  4.  Every 4-6 hours:  If on nasal continuous positive airway pressure, respiratory therapy assess nares and determine need for appliance change or resting period.  8/1/2024 1137 by Ruma Toscano, RN  Outcome: Progressing  Note: Ongoing assessment & interventions provided throughout shift.  Skin assessments provided.  Encouraging/assisting patient to turn as needed.      Problem: Safety - Adult  Goal: Free from fall injury  8/1/2024 1137 by Ruma Toscano, RN  Outcome: Progressing  Flowsheets (Taken 8/1/2024 1137)  Free From Fall Injury: Instruct family/caregiver on patient safety  Note: Bed locked & in low position, call light in reach, side-rails up x2, bed/chair alarm utilized, non-slip socks on when ambulating, reminded patient to use call light to call for assistance.      Problem: Chronic Conditions and Co-morbidities  Goal: Patient's chronic conditions and co-morbidity symptoms are monitored and maintained or improved  8/1/2024 1137 by Ruma Toscano, RN  Outcome: Progressing  Flowsheets (Taken 8/1/2024 1137)  Care Plan - Patient's Chronic Conditions and Co-Morbidity Symptoms are Monitored and Maintained or Improved:   Monitor and assess patient's chronic conditions and comorbid symptoms for stability, deterioration, or  05-Jun-2022

## 2024-08-01 NOTE — ED NOTES
Pt medicated per MAR at this time. Pt updated on POC, verbalized understanding. Call light in reach. Telemetry in place.    
Pt resting quietly in bed. Respirations even and unlabored. Call light in reach. Telemetry in place. Pt voices no further questions or concerns.     
Spoke to Lashawn on 3B who approved patient transfer to -21. Patient transported upstairs in stable condition.  
          Electronically signed by Maylin Monaco RN on 8/1/2024 at 12:42 AM

## 2024-08-01 NOTE — ED TRIAGE NOTES
Pt comes to ED with c/o shortness of breath. Pt reports that he had a stent placed x2 weeks ago at [facility]. Pt was then dx with pneumonia and was hospitalized until earlier today. Pt breathing is labored and reports that is gets worse with exertion. EKG completed. Pt placed on telemetry.

## 2024-08-01 NOTE — ED PROVIDER NOTES
[facility] CCU-STEPDOWN 3B  EMERGENCY DEPARTMENT ENCOUNTER          Pt Name: Gurpreet Broderick  MRN: [id number]  Birthdate 1955  Date of evaluation: 7/31/2024  Physician: Kevin Peter DO      CHIEF COMPLAINT       Chief Complaint   Patient presents with    Shortness of Breath         HISTORY OF PRESENT ILLNESS    HPI  Gurpreet Broderick is a 69 y.o. male who presents to the emergency department from home, by private vehicle for evaluation of shortness of breath, feeling unwell, and lethargy.  Patient was recently discharged earlier from [facility] after being hospitalized for treatment of pneumonia.  Patient recently had stent placement maximally 1 to 2 months ago at [facility].  Patient reports just feeling short of breath and feeling unwell.  He denies any nausea or vomiting.  He denies any chest pain with his shortness of breath.  The patient has no other acute complaints at this time.      REVIEW OF SYSTEMS   Review of Systems   All other systems reviewed and are negative.        PAST MEDICAL AND SURGICAL HISTORY     Past Medical History:   Diagnosis Date    Diabetes (HCC)     Hypertension      History reviewed. No pertinent surgical history.      MEDICATIONS     Current Facility-Administered Medications:     sodium chloride flush 0.9 % injection 5-40 mL, 5-40 mL, IntraVENous, 2 times per day, Alex Carter, APRN - CNP    sodium chloride flush 0.9 % injection 5-40 mL, 5-40 mL, IntraVENous, PRN, Alex Carter, ENOC - CNP    0.9 % sodium chloride infusion, , IntraVENous, PRN, Alex Carter, APRN - CNP    potassium chloride (KLOR-CON M) extended release tablet 40 mEq, 40 mEq, Oral, PRN **OR** potassium bicarb-citric acid (EFFER-K) effervescent tablet 40 mEq, 40 mEq, Oral, PRN **OR** potassium chloride 10 mEq/100 mL IVPB (Peripheral Line), 10 mEq, IntraVENous, PRN, Alex Carter, APRN - CNP    magnesium sulfate 2000 mg in 50 mL IVPB premix, 2,000 mg, IntraVENous, PRN, Alex Carter

## 2024-08-01 NOTE — CONSULTS
[facility]    [address], [place] [address]  [phone]     Hospital Consult  8/1/2024 12:23 PM    Pt Name:    Gurpreet Broderick  MRN:     [id number]   [id number]  YOB: 1955  Admit Date:    7/31/2024  9:57 PM  Primary Care Physician:  Sara Cardona APRN - CNP    CSN Number:   [id number]    Reason for Consult:  Elevated creatinine  Requesting provider:  Hospitalist    History:   The patient is a 69 y.o. -American male with history of hypertension, HFrEF who was evaluated in the emergency room for shortness of breath.  Apparently patient was recently discharged from outside hospital with.  He reports that he became very short of breath after he was discharged.  He therefore came to the emergency room at [facility].  Does not entirely clear if he follows closely with his outpatient physicians.  Patient reports he has had some leg swelling for few days.  Reports that he was diagnosed with CVA in 2023.  Not a very good historian at times.  Compliance is an issue at times as well.  In the emergency room he was noted to be fluid overloaded.  Chest x-ray showed pleural effusion and pulm edema.  proBNP was 22,000.  He was admitted.  Nephrology consulted this morning.    Past Medical History:  Past Medical History:   Diagnosis Date    Diabetes (HCC)     Hypertension        Past Surgical History:  History reviewed. No pertinent surgical history.    Family History:  History reviewed. No pertinent family history.    Social History:  Social History     Socioeconomic History    Marital status: Single     Spouse name: Not on file    Number of children: Not on file    Years of education: Not on file    Highest education level: Not on file   Occupational History    Not on file   Tobacco Use    Smoking status: Every Day     Types: Cigarettes    Smokeless tobacco: Never   Substance and Sexual Activity    Alcohol use: Not on file    Drug use: Not on file    Sexual activity: Not on 
                                  Ascending Aorta: 4.1 cm                                                 LA volume/Index: 38 ml   LV Area                                       /19m^2   Diastolic: 35.4   cm^2   LV Area   Systolic: 27.8   cm^2     Doppler Measurements & Calculations      MV Peak E-Wave:   AV Peak Velocity: 143 cm/s    LVOT Peak Velocity: 96.6   40.4 cm/s         AV Peak Gradient: 8.18 mmHg   cm/s   MV Peak A-Wave:   AV Mean Velocity: 98.6 cm/s   LVOT Mean Velocity: 58.4   98 cm/s           AV Mean Gradient: 4 mmHg      cm/s   MV E/A Ratio:     AV VTI: 21.4 cm               LVOT Peak Gradient: 4   0.41                                            mmHgLVOT Mean Gradient: 2   MV Peak Gradient:                               mmHg   0.65 mmHg         LVOT VTI: 15.9 cm                     IVRT: 121 msec                TV Peak E-Wave: 42.4 cm/s   MV Deceleration                                 TV Peak A-Wave: 46.1 cm/s   Time: 167 msec    Pulm. Vein A Reversal   MV P1/2t: 49 msec Duration:95 msec              TV Peak Gradient: 0.72   MVA by PHT:4.49   Pulm. Vein D Velocity:47.9    mmHg   cm^2              cm/sPulm. Vein A Reversal     TR Velocity:237 cm/s                     Velocity:29.2 cm/s            TR Gradient:22.47 mmHg   MV E' Septal      Pulm. Vein S Velocity: 39.7   PV Peak Velocity: 76 cm/s   Velocity: 3.8     cm/s                          PV Peak Gradient: 2.31   cm/s              AV DVI (VTI): 0.74AV DVI      mmHg   MV A' Septal      (Vmax):0.68   Velocity: 8.3   cm/s   MV E' Lateral   Velocity: 3.7   cm/s   MV A' Lateral   Velocity: 9 cm/s   E/E' septal:   10.63   E/E' lateral:   10.92     [id number]        All labs, EKG's, diagnostic testing and images as well as cardiac cath, stress testing were reviewed during this encounter    Past Medical History:   Diagnosis Date    Diabetes

## 2024-08-02 PROBLEM — N17.9 AKI (ACUTE KIDNEY INJURY) (HCC): Status: ACTIVE | Noted: 2024-08-02

## 2024-08-02 PROBLEM — E87.70 HYPERVOLEMIA DUE TO CONGESTIVE HEART FAILURE (HCC): Status: ACTIVE | Noted: 2024-08-02

## 2024-08-02 PROBLEM — I50.9 HYPERVOLEMIA DUE TO CONGESTIVE HEART FAILURE (HCC): Status: ACTIVE | Noted: 2024-08-02

## 2024-08-02 LAB
ANION GAP SERPL CALC-SCNC: 12 MEQ/L (ref 8–16)
BACTERIA: ABNORMAL
BASOPHILS ABSOLUTE: 0 THOU/MM3 (ref 0–0.1)
BASOPHILS NFR BLD AUTO: 0.8 %
BILIRUB UR QL STRIP: NEGATIVE
BUN SERPL-MCNC: 42 MG/DL (ref 7–22)
CA-I BLD ISE-SCNC: 1.13 MMOL/L (ref 1.12–1.32)
CALCIUM SERPL-MCNC: 8.4 MG/DL (ref 8.5–10.5)
CASTS #/AREA URNS LPF: ABNORMAL /LPF
CASTS #/AREA URNS LPF: ABNORMAL /LPF
CHARACTER UR: CLEAR
CHARCOAL URNS QL MICRO: ABNORMAL
CHLORIDE 24H UR-SRATE: 99 MEQ/L
CHLORIDE SERPL-SCNC: 97 MEQ/L (ref 98–111)
CO2 SERPL-SCNC: 26 MEQ/L (ref 23–33)
COLOR UR: YELLOW
CREAT SERPL-MCNC: 1.3 MG/DL (ref 0.4–1.2)
CREAT UR-MCNC: 25.1 MG/DL
CRYSTALS URNS QL MICRO: ABNORMAL
DEPRECATED RDW RBC AUTO: 53.8 FL (ref 35–45)
EOSINOPHIL NFR BLD AUTO: 4.1 %
EOSINOPHILS ABSOLUTE: 0.2 THOU/MM3 (ref 0–0.4)
ERYTHROCYTE [DISTWIDTH] IN BLOOD BY AUTOMATED COUNT: 16.2 % (ref 11.5–14.5)
GFR SERPL CREATININE-BSD FRML MDRD: 60 ML/MIN/1.73M2
GLUCOSE BLD STRIP.AUTO-MCNC: 130 MG/DL (ref 70–108)
GLUCOSE BLD STRIP.AUTO-MCNC: 141 MG/DL (ref 70–108)
GLUCOSE BLD STRIP.AUTO-MCNC: 187 MG/DL (ref 70–108)
GLUCOSE BLD STRIP.AUTO-MCNC: 246 MG/DL (ref 70–108)
GLUCOSE SERPL-MCNC: 147 MG/DL (ref 70–108)
GLUCOSE UR QL STRIP.AUTO: 500 MG/DL
HCT VFR BLD AUTO: 35.8 % (ref 42–52)
HGB BLD-MCNC: 12.2 GM/DL (ref 14–18)
HGB UR QL STRIP.AUTO: NEGATIVE
IMM GRANULOCYTES # BLD AUTO: 0.01 THOU/MM3 (ref 0–0.07)
IMM GRANULOCYTES NFR BLD AUTO: 0.2 %
KETONES UR QL STRIP.AUTO: NEGATIVE
LACTATE SERPL-SCNC: 1.6 MMOL/L (ref 0.5–2)
LEUKOCYTE ESTERASE UR QL STRIP.AUTO: NEGATIVE
LYMPHOCYTES ABSOLUTE: 1.6 THOU/MM3 (ref 1–4.8)
LYMPHOCYTES NFR BLD AUTO: 31 %
MAGNESIUM SERPL-MCNC: 2 MG/DL (ref 1.6–2.4)
MCH RBC QN AUTO: 31.8 PG (ref 26–33)
MCHC RBC AUTO-ENTMCNC: 34.1 GM/DL (ref 32.2–35.5)
MCV RBC AUTO: 93.2 FL (ref 80–94)
MONOCYTES ABSOLUTE: 0.6 THOU/MM3 (ref 0.4–1.3)
MONOCYTES NFR BLD AUTO: 12.4 %
NEUTROPHILS ABSOLUTE: 2.6 THOU/MM3 (ref 1.8–7.7)
NEUTROPHILS NFR BLD AUTO: 51.5 %
NITRITE UR QL STRIP.AUTO: NEGATIVE
NRBC BLD AUTO-RTO: 1 /100 WBC
PH UR STRIP.AUTO: 5.5 [PH] (ref 5–9)
PLATELET # BLD AUTO: 206 THOU/MM3 (ref 130–400)
PMV BLD AUTO: 11.4 FL (ref 9.4–12.4)
POTASSIUM SERPL-SCNC: 4 MEQ/L (ref 3.5–5.2)
PROT UR STRIP.AUTO-MCNC: NEGATIVE MG/DL
PROT UR-MCNC: 15.3 MG/DL
PROT/CREAT 24H UR: 0.61 MG/G{CREAT}
RBC # BLD AUTO: 3.84 MILL/MM3 (ref 4.7–6.1)
RBC #/AREA URNS HPF: ABNORMAL /HPF
RENAL EPI CELLS #/AREA URNS HPF: ABNORMAL /[HPF]
SODIUM SERPL-SCNC: 135 MEQ/L (ref 135–145)
SODIUM UR-SCNC: 96 MEQ/L
SPECIFIC GRAVITY UA: 1.01 (ref 1–1.03)
UROBILINOGEN, URINE: 1 EU/DL (ref 0–1)
WBC # BLD AUTO: 5.1 THOU/MM3 (ref 4.8–10.8)
WBC #/AREA URNS HPF: ABNORMAL /HPF
YEAST LIKE FUNGI URNS QL MICRO: ABNORMAL

## 2024-08-02 PROCEDURE — 81001 URINALYSIS AUTO W/SCOPE: CPT

## 2024-08-02 PROCEDURE — 6370000000 HC RX 637 (ALT 250 FOR IP)

## 2024-08-02 PROCEDURE — 2580000003 HC RX 258

## 2024-08-02 PROCEDURE — 6360000002 HC RX W HCPCS

## 2024-08-02 PROCEDURE — 83735 ASSAY OF MAGNESIUM: CPT

## 2024-08-02 PROCEDURE — 83605 ASSAY OF LACTIC ACID: CPT

## 2024-08-02 PROCEDURE — 97162 PT EVAL MOD COMPLEX 30 MIN: CPT

## 2024-08-02 PROCEDURE — 99233 SBSQ HOSP IP/OBS HIGH 50: CPT | Performed by: STUDENT IN AN ORGANIZED HEALTH CARE EDUCATION/TRAINING PROGRAM

## 2024-08-02 PROCEDURE — 85025 COMPLETE CBC W/AUTO DIFF WBC: CPT

## 2024-08-02 PROCEDURE — 99232 SBSQ HOSP IP/OBS MODERATE 35: CPT | Performed by: INTERNAL MEDICINE

## 2024-08-02 PROCEDURE — 82330 ASSAY OF CALCIUM: CPT

## 2024-08-02 PROCEDURE — 84300 ASSAY OF URINE SODIUM: CPT

## 2024-08-02 PROCEDURE — 99232 SBSQ HOSP IP/OBS MODERATE 35: CPT | Performed by: STUDENT IN AN ORGANIZED HEALTH CARE EDUCATION/TRAINING PROGRAM

## 2024-08-02 PROCEDURE — 82948 REAGENT STRIP/BLOOD GLUCOSE: CPT

## 2024-08-02 PROCEDURE — 2140000000 HC CCU INTERMEDIATE R&B

## 2024-08-02 PROCEDURE — 97166 OT EVAL MOD COMPLEX 45 MIN: CPT

## 2024-08-02 PROCEDURE — 87086 URINE CULTURE/COLONY COUNT: CPT

## 2024-08-02 PROCEDURE — 36415 COLL VENOUS BLD VENIPUNCTURE: CPT

## 2024-08-02 PROCEDURE — 97530 THERAPEUTIC ACTIVITIES: CPT

## 2024-08-02 PROCEDURE — 6370000000 HC RX 637 (ALT 250 FOR IP): Performed by: STUDENT IN AN ORGANIZED HEALTH CARE EDUCATION/TRAINING PROGRAM

## 2024-08-02 PROCEDURE — 97116 GAIT TRAINING THERAPY: CPT

## 2024-08-02 PROCEDURE — 80048 BASIC METABOLIC PNL TOTAL CA: CPT

## 2024-08-02 PROCEDURE — 82436 ASSAY OF URINE CHLORIDE: CPT

## 2024-08-02 PROCEDURE — 84156 ASSAY OF PROTEIN URINE: CPT

## 2024-08-02 PROCEDURE — 82570 ASSAY OF URINE CREATININE: CPT

## 2024-08-02 RX ORDER — CEFDINIR 300 MG/1
300 CAPSULE ORAL EVERY 12 HOURS SCHEDULED
Status: COMPLETED | OUTPATIENT
Start: 2024-08-02 | End: 2024-08-05

## 2024-08-02 RX ORDER — INSULIN LISPRO 100 [IU]/ML
0-4 INJECTION, SOLUTION INTRAVENOUS; SUBCUTANEOUS NIGHTLY
Status: DISCONTINUED | OUTPATIENT
Start: 2024-08-02 | End: 2024-08-05 | Stop reason: HOSPADM

## 2024-08-02 RX ORDER — INSULIN LISPRO 100 [IU]/ML
0-16 INJECTION, SOLUTION INTRAVENOUS; SUBCUTANEOUS
Status: DISCONTINUED | OUTPATIENT
Start: 2024-08-02 | End: 2024-08-05 | Stop reason: HOSPADM

## 2024-08-02 RX ADMIN — ENOXAPARIN SODIUM 40 MG: 100 INJECTION SUBCUTANEOUS at 08:55

## 2024-08-02 RX ADMIN — ATORVASTATIN CALCIUM 80 MG: 80 TABLET, FILM COATED ORAL at 21:12

## 2024-08-02 RX ADMIN — CARVEDILOL 12.5 MG: 6.25 TABLET, FILM COATED ORAL at 08:56

## 2024-08-02 RX ADMIN — CLOPIDOGREL BISULFATE 75 MG: 75 TABLET ORAL at 08:55

## 2024-08-02 RX ADMIN — CEFTRIAXONE SODIUM 1000 MG: 1 INJECTION, POWDER, FOR SOLUTION INTRAMUSCULAR; INTRAVENOUS at 09:03

## 2024-08-02 RX ADMIN — ASPIRIN 81 MG 81 MG: 81 TABLET ORAL at 08:55

## 2024-08-02 RX ADMIN — CEFDINIR 300 MG: 300 CAPSULE ORAL at 21:12

## 2024-08-02 RX ADMIN — INSULIN LISPRO 4 UNITS: 100 INJECTION, SOLUTION INTRAVENOUS; SUBCUTANEOUS at 17:27

## 2024-08-02 RX ADMIN — BUMETANIDE 1 MG: 0.25 INJECTION INTRAMUSCULAR; INTRAVENOUS at 17:27

## 2024-08-02 RX ADMIN — CARVEDILOL 12.5 MG: 6.25 TABLET, FILM COATED ORAL at 18:03

## 2024-08-02 RX ADMIN — SODIUM CHLORIDE, PRESERVATIVE FREE 10 ML: 5 INJECTION INTRAVENOUS at 21:12

## 2024-08-02 RX ADMIN — SODIUM CHLORIDE, PRESERVATIVE FREE 10 ML: 5 INJECTION INTRAVENOUS at 08:55

## 2024-08-02 RX ADMIN — BUMETANIDE 1 MG: 0.25 INJECTION INTRAMUSCULAR; INTRAVENOUS at 08:55

## 2024-08-02 ASSESSMENT — PAIN SCALES - GENERAL: PAINLEVEL_OUTOF10: 2

## 2024-08-02 NOTE — PLAN OF CARE
Problem: Discharge Planning  Goal: Discharge to home or other facility with appropriate resources  Outcome: Progressing  Flowsheets (Taken 8/2/2024 0152)  Discharge to home or other facility with appropriate resources:   Identify barriers to discharge with patient and caregiver   Arrange for needed discharge resources and transportation as appropriate     Problem: Skin/Tissue Integrity  Goal: Absence of new skin breakdown  Description: 1.  Monitor for areas of redness and/or skin breakdown  2.  Assess vascular access sites hourly  3.  Every 4-6 hours minimum:  Change oxygen saturation probe site  4.  Every 4-6 hours:  If on nasal continuous positive airway pressure, respiratory therapy assess nares and determine need for appliance change or resting period.  Outcome: Progressing     Problem: Safety - Adult  Goal: Free from fall injury  Outcome: Progressing  Flowsheets (Taken 8/2/2024 0152)  Free From Fall Injury: Instruct family/caregiver on patient safety  Note: Bed locked & in low position, call light in reach, side-rails up x2, bed/chair alarm utilized, non-slip socks on when ambulating, reminded patient to use call light to call for assistance.       Problem: Chronic Conditions and Co-morbidities  Goal: Patient's chronic conditions and co-morbidity symptoms are monitored and maintained or improved  Outcome: Progressing  Flowsheets (Taken 8/2/2024 0152)  Care Plan - Patient's Chronic Conditions and Co-Morbidity Symptoms are Monitored and Maintained or Improved: Monitor and assess patient's chronic conditions and comorbid symptoms for stability, deterioration, or improvement     Problem: Cardiovascular - Adult  Goal: Maintains optimal cardiac output and hemodynamic stability  Outcome: Progressing  Flowsheets (Taken 8/2/2024 0152)  Maintains optimal cardiac output and hemodynamic stability:   Monitor blood pressure and heart rate   Assess for signs of decreased cardiac output     Problem: Cardiovascular -

## 2024-08-02 NOTE — PLAN OF CARE
Problem: Discharge Planning  Goal: Discharge to home or other facility with appropriate resources  8/2/2024 1852 by Maya York, RN  Outcome: Progressing  Flowsheets (Taken 8/2/2024 1852)  Discharge to home or other facility with appropriate resources:   Identify barriers to discharge with patient and caregiver   Arrange for needed discharge resources and transportation as appropriate  8/2/2024 1218 by Jodee Mercado LSW  Outcome: Progressing  Flowsheets (Taken 8/2/2024 0845 by Maya York, RN)  Discharge to home or other facility with appropriate resources: Identify barriers to discharge with patient and caregiver     Problem: Skin/Tissue Integrity  Goal: Absence of new skin breakdown  Description: 1.  Monitor for areas of redness and/or skin breakdown  2.  Assess vascular access sites hourly  3.  Every 4-6 hours minimum:  Change oxygen saturation probe site  4.  Every 4-6 hours:  If on nasal continuous positive airway pressure, respiratory therapy assess nares and determine need for appliance change or resting period.  Outcome: Progressing  Note: Ongoing assessment & interventions provided throughout shift.  Skin assessments provided.  Encouraging/assisting patient to turn as needed.       Problem: Safety - Adult  Goal: Free from fall injury  Outcome: Progressing  Flowsheets (Taken 8/2/2024 1852)  Free From Fall Injury: Instruct family/caregiver on patient safety     Problem: Chronic Conditions and Co-morbidities  Goal: Patient's chronic conditions and co-morbidity symptoms are monitored and maintained or improved  Outcome: Progressing  Flowsheets  Taken 8/2/2024 1852  Care Plan - Patient's Chronic Conditions and Co-Morbidity Symptoms are Monitored and Maintained or Improved: Monitor and assess patient's chronic conditions and comorbid symptoms for stability, deterioration, or improvement  Taken 8/2/2024 0845  Care Plan - Patient's Chronic Conditions and Co-Morbidity Symptoms are Monitored and

## 2024-08-03 LAB
ANION GAP SERPL CALC-SCNC: 14 MEQ/L (ref 8–16)
BASOPHILS ABSOLUTE: 0.1 THOU/MM3 (ref 0–0.1)
BASOPHILS NFR BLD AUTO: 1.2 %
BUN SERPL-MCNC: 40 MG/DL (ref 7–22)
CALCIUM SERPL-MCNC: 8.6 MG/DL (ref 8.5–10.5)
CHLORIDE SERPL-SCNC: 95 MEQ/L (ref 98–111)
CO2 SERPL-SCNC: 25 MEQ/L (ref 23–33)
CREAT SERPL-MCNC: 1.3 MG/DL (ref 0.4–1.2)
DEPRECATED RDW RBC AUTO: 54.8 FL (ref 35–45)
EOSINOPHIL NFR BLD AUTO: 3 %
EOSINOPHILS ABSOLUTE: 0.2 THOU/MM3 (ref 0–0.4)
ERYTHROCYTE [DISTWIDTH] IN BLOOD BY AUTOMATED COUNT: 16.4 % (ref 11.5–14.5)
GFR SERPL CREATININE-BSD FRML MDRD: 60 ML/MIN/1.73M2
GLUCOSE BLD STRIP.AUTO-MCNC: 161 MG/DL (ref 70–108)
GLUCOSE BLD STRIP.AUTO-MCNC: 166 MG/DL (ref 70–108)
GLUCOSE BLD STRIP.AUTO-MCNC: 193 MG/DL (ref 70–108)
GLUCOSE BLD STRIP.AUTO-MCNC: 223 MG/DL (ref 70–108)
GLUCOSE SERPL-MCNC: 134 MG/DL (ref 70–108)
HCT VFR BLD AUTO: 39.9 % (ref 42–52)
HGB BLD-MCNC: 13.3 GM/DL (ref 14–18)
IMM GRANULOCYTES # BLD AUTO: 0.01 THOU/MM3 (ref 0–0.07)
IMM GRANULOCYTES NFR BLD AUTO: 0.2 %
LYMPHOCYTES ABSOLUTE: 2.1 THOU/MM3 (ref 1–4.8)
LYMPHOCYTES NFR BLD AUTO: 36.9 %
MAGNESIUM SERPL-MCNC: 2.1 MG/DL (ref 1.6–2.4)
MCH RBC QN AUTO: 31.1 PG (ref 26–33)
MCHC RBC AUTO-ENTMCNC: 33.3 GM/DL (ref 32.2–35.5)
MCV RBC AUTO: 93.2 FL (ref 80–94)
MONOCYTES ABSOLUTE: 0.5 THOU/MM3 (ref 0.4–1.3)
MONOCYTES NFR BLD AUTO: 9.2 %
NEUTROPHILS ABSOLUTE: 2.8 THOU/MM3 (ref 1.8–7.7)
NEUTROPHILS NFR BLD AUTO: 49.5 %
NRBC BLD AUTO-RTO: 1 /100 WBC
PLATELET # BLD AUTO: 232 THOU/MM3 (ref 130–400)
PMV BLD AUTO: 11.6 FL (ref 9.4–12.4)
POTASSIUM SERPL-SCNC: 3.9 MEQ/L (ref 3.5–5.2)
RBC # BLD AUTO: 4.28 MILL/MM3 (ref 4.7–6.1)
SODIUM SERPL-SCNC: 134 MEQ/L (ref 135–145)
WBC # BLD AUTO: 5.7 THOU/MM3 (ref 4.8–10.8)

## 2024-08-03 PROCEDURE — 85025 COMPLETE CBC W/AUTO DIFF WBC: CPT

## 2024-08-03 PROCEDURE — 82948 REAGENT STRIP/BLOOD GLUCOSE: CPT

## 2024-08-03 PROCEDURE — 6370000000 HC RX 637 (ALT 250 FOR IP): Performed by: STUDENT IN AN ORGANIZED HEALTH CARE EDUCATION/TRAINING PROGRAM

## 2024-08-03 PROCEDURE — 6370000000 HC RX 637 (ALT 250 FOR IP)

## 2024-08-03 PROCEDURE — 2140000000 HC CCU INTERMEDIATE R&B

## 2024-08-03 PROCEDURE — 99232 SBSQ HOSP IP/OBS MODERATE 35: CPT | Performed by: INTERNAL MEDICINE

## 2024-08-03 PROCEDURE — 2580000003 HC RX 258

## 2024-08-03 PROCEDURE — 83735 ASSAY OF MAGNESIUM: CPT

## 2024-08-03 PROCEDURE — 80048 BASIC METABOLIC PNL TOTAL CA: CPT

## 2024-08-03 PROCEDURE — 6360000002 HC RX W HCPCS

## 2024-08-03 PROCEDURE — 99233 SBSQ HOSP IP/OBS HIGH 50: CPT | Performed by: STUDENT IN AN ORGANIZED HEALTH CARE EDUCATION/TRAINING PROGRAM

## 2024-08-03 PROCEDURE — 36415 COLL VENOUS BLD VENIPUNCTURE: CPT

## 2024-08-03 PROCEDURE — 6360000002 HC RX W HCPCS: Performed by: STUDENT IN AN ORGANIZED HEALTH CARE EDUCATION/TRAINING PROGRAM

## 2024-08-03 RX ORDER — BUMETANIDE 1 MG/1
1 TABLET ORAL 2 TIMES DAILY
Status: DISCONTINUED | OUTPATIENT
Start: 2024-08-03 | End: 2024-08-05 | Stop reason: HOSPADM

## 2024-08-03 RX ORDER — BUMETANIDE 0.25 MG/ML
1 INJECTION INTRAMUSCULAR; INTRAVENOUS EVERY 12 HOURS
Status: DISCONTINUED | OUTPATIENT
Start: 2024-08-03 | End: 2024-08-05 | Stop reason: HOSPADM

## 2024-08-03 RX ADMIN — SODIUM CHLORIDE, PRESERVATIVE FREE 10 ML: 5 INJECTION INTRAVENOUS at 20:30

## 2024-08-03 RX ADMIN — BUMETANIDE 1 MG: 0.25 INJECTION INTRAMUSCULAR; INTRAVENOUS at 09:18

## 2024-08-03 RX ADMIN — CARVEDILOL 12.5 MG: 6.25 TABLET, FILM COATED ORAL at 09:20

## 2024-08-03 RX ADMIN — CARVEDILOL 12.5 MG: 6.25 TABLET, FILM COATED ORAL at 18:56

## 2024-08-03 RX ADMIN — SODIUM CHLORIDE, PRESERVATIVE FREE 10 ML: 5 INJECTION INTRAVENOUS at 09:20

## 2024-08-03 RX ADMIN — ATORVASTATIN CALCIUM 80 MG: 80 TABLET, FILM COATED ORAL at 19:53

## 2024-08-03 RX ADMIN — ASPIRIN 81 MG 81 MG: 81 TABLET ORAL at 09:18

## 2024-08-03 RX ADMIN — BUMETANIDE 1 MG: 0.25 INJECTION INTRAMUSCULAR; INTRAVENOUS at 19:53

## 2024-08-03 RX ADMIN — CEFDINIR 300 MG: 300 CAPSULE ORAL at 09:20

## 2024-08-03 RX ADMIN — CLOPIDOGREL BISULFATE 75 MG: 75 TABLET ORAL at 09:18

## 2024-08-03 RX ADMIN — CEFDINIR 300 MG: 300 CAPSULE ORAL at 19:53

## 2024-08-03 RX ADMIN — ENOXAPARIN SODIUM 40 MG: 100 INJECTION SUBCUTANEOUS at 09:20

## 2024-08-03 NOTE — PLAN OF CARE
Problem: Discharge Planning  Goal: Discharge to home or other facility with appropriate resources  8/2/2024 2149 by Li Garvey RN  Outcome: Progressing  Flowsheets (Taken 8/2/2024 2149)  Discharge to home or other facility with appropriate resources:   Identify barriers to discharge with patient and caregiver   Identify discharge learning needs (meds, wound care, etc)     Problem: Skin/Tissue Integrity  Goal: Absence of new skin breakdown  Description: 1.  Monitor for areas of redness and/or skin breakdown  2.  Assess vascular access sites hourly  3.  Every 4-6 hours minimum:  Change oxygen saturation probe site  4.  Every 4-6 hours:  If on nasal continuous positive airway pressure, respiratory therapy assess nares and determine need for appliance change or resting period.  8/2/2024 2149 by Li Garvey RN  Outcome: Progressing     Problem: Safety - Adult  Goal: Free from fall injury  8/2/2024 2149 by Li Garvey RN  Outcome: Progressing  Flowsheets (Taken 8/2/2024 2149)  Free From Fall Injury: Based on caregiver fall risk screen, instruct family/caregiver to ask for assistance with transferring infant if caregiver noted to have fall risk factors     Problem: Chronic Conditions and Co-morbidities  Goal: Patient's chronic conditions and co-morbidity symptoms are monitored and maintained or improved  8/2/2024 2149 by Li Garvey RN  Outcome: Progressing  Flowsheets (Taken 8/2/2024 2149)  Care Plan - Patient's Chronic Conditions and Co-Morbidity Symptoms are Monitored and Maintained or Improved: Monitor and assess patient's chronic conditions and comorbid symptoms for stability, deterioration, or improvement     Problem: Cardiovascular - Adult  Goal: Maintains optimal cardiac output and hemodynamic stability  8/2/2024 2149 by Li Garvey, RN  Outcome: Progressing  Flowsheets (Taken 8/2/2024 2149)  Maintains optimal cardiac output and hemodynamic stability: Monitor blood pressure and heart

## 2024-08-03 NOTE — DISCHARGE INSTRUCTIONS
F/u with your primary cardiologist in 2 weeks.     Continue:  Continue current medications  Daily weights and record  Fluid restriction of 2 Liters per day  Limit sodium in diet to around 5082-3937 mg/day  Monitor BP  Activity as tolerated     Call your doctor for:   Weight gain of 2-3 pounds in 1 day or 5 pounds in 1 week  Increased shortness of breath  Shortness of breath while laying down  Cough  Chest pain  Swelling in feet, ankles or legs  Tenderness or bloating in the abdomen  Fatigue   Decreased appetite or nausea   Confusion

## 2024-08-03 NOTE — PLAN OF CARE
Problem: Cardiovascular - Adult  Goal: Maintains optimal cardiac output and hemodynamic stability  8/3/2024 1145 by Tuyet Santacruz, RN  Outcome: Progressing  Flowsheets (Taken 8/3/2024 1145)  Maintains optimal cardiac output and hemodynamic stability:   Monitor blood pressure and heart rate   Assess for signs of decreased cardiac output  8/2/2024 2149 by Li Garvey, RN  Outcome: Progressing  Flowsheets (Taken 8/2/2024 2149)  Maintains optimal cardiac output and hemodynamic stability: Monitor blood pressure and heart rate

## 2024-08-04 LAB
ANION GAP SERPL CALC-SCNC: 11 MEQ/L (ref 8–16)
BACTERIA UR CULT: NORMAL
BASOPHILS ABSOLUTE: 0.1 THOU/MM3 (ref 0–0.1)
BASOPHILS NFR BLD AUTO: 1.1 %
BUN SERPL-MCNC: 42 MG/DL (ref 7–22)
CALCIUM SERPL-MCNC: 8.4 MG/DL (ref 8.5–10.5)
CHLORIDE SERPL-SCNC: 94 MEQ/L (ref 98–111)
CO2 SERPL-SCNC: 29 MEQ/L (ref 23–33)
CREAT SERPL-MCNC: 1.4 MG/DL (ref 0.4–1.2)
DEPRECATED RDW RBC AUTO: 57.1 FL (ref 35–45)
EOSINOPHIL NFR BLD AUTO: 5 %
EOSINOPHILS ABSOLUTE: 0.3 THOU/MM3 (ref 0–0.4)
ERYTHROCYTE [DISTWIDTH] IN BLOOD BY AUTOMATED COUNT: 16.8 % (ref 11.5–14.5)
GFR SERPL CREATININE-BSD FRML MDRD: 55 ML/MIN/1.73M2
GLUCOSE BLD STRIP.AUTO-MCNC: 141 MG/DL (ref 70–108)
GLUCOSE BLD STRIP.AUTO-MCNC: 148 MG/DL (ref 70–108)
GLUCOSE BLD STRIP.AUTO-MCNC: 155 MG/DL (ref 70–108)
GLUCOSE BLD STRIP.AUTO-MCNC: 185 MG/DL (ref 70–108)
GLUCOSE SERPL-MCNC: 170 MG/DL (ref 70–108)
HCT VFR BLD AUTO: 35.6 % (ref 42–52)
HGB BLD-MCNC: 11.9 GM/DL (ref 14–18)
IMM GRANULOCYTES # BLD AUTO: 0.01 THOU/MM3 (ref 0–0.07)
IMM GRANULOCYTES NFR BLD AUTO: 0.2 %
LYMPHOCYTES ABSOLUTE: 2.2 THOU/MM3 (ref 1–4.8)
LYMPHOCYTES NFR BLD AUTO: 40.2 %
MCH RBC QN AUTO: 31.6 PG (ref 26–33)
MCHC RBC AUTO-ENTMCNC: 33.4 GM/DL (ref 32.2–35.5)
MCV RBC AUTO: 94.4 FL (ref 80–94)
MONOCYTES ABSOLUTE: 0.6 THOU/MM3 (ref 0.4–1.3)
MONOCYTES NFR BLD AUTO: 10.2 %
NEUTROPHILS ABSOLUTE: 2.3 THOU/MM3 (ref 1.8–7.7)
NEUTROPHILS NFR BLD AUTO: 43.3 %
NRBC BLD AUTO-RTO: 1 /100 WBC
PLATELET # BLD AUTO: 201 THOU/MM3 (ref 130–400)
PMV BLD AUTO: 11.8 FL (ref 9.4–12.4)
POTASSIUM SERPL-SCNC: 4 MEQ/L (ref 3.5–5.2)
RBC # BLD AUTO: 3.77 MILL/MM3 (ref 4.7–6.1)
SODIUM SERPL-SCNC: 134 MEQ/L (ref 135–145)
WBC # BLD AUTO: 5.4 THOU/MM3 (ref 4.8–10.8)

## 2024-08-04 PROCEDURE — 82948 REAGENT STRIP/BLOOD GLUCOSE: CPT

## 2024-08-04 PROCEDURE — 6360000002 HC RX W HCPCS: Performed by: STUDENT IN AN ORGANIZED HEALTH CARE EDUCATION/TRAINING PROGRAM

## 2024-08-04 PROCEDURE — 6370000000 HC RX 637 (ALT 250 FOR IP)

## 2024-08-04 PROCEDURE — 99232 SBSQ HOSP IP/OBS MODERATE 35: CPT | Performed by: INTERNAL MEDICINE

## 2024-08-04 PROCEDURE — 80048 BASIC METABOLIC PNL TOTAL CA: CPT

## 2024-08-04 PROCEDURE — 2140000000 HC CCU INTERMEDIATE R&B

## 2024-08-04 PROCEDURE — 6360000002 HC RX W HCPCS

## 2024-08-04 PROCEDURE — 99232 SBSQ HOSP IP/OBS MODERATE 35: CPT | Performed by: STUDENT IN AN ORGANIZED HEALTH CARE EDUCATION/TRAINING PROGRAM

## 2024-08-04 PROCEDURE — 85025 COMPLETE CBC W/AUTO DIFF WBC: CPT

## 2024-08-04 PROCEDURE — 2580000003 HC RX 258

## 2024-08-04 PROCEDURE — 36415 COLL VENOUS BLD VENIPUNCTURE: CPT

## 2024-08-04 PROCEDURE — 6370000000 HC RX 637 (ALT 250 FOR IP): Performed by: STUDENT IN AN ORGANIZED HEALTH CARE EDUCATION/TRAINING PROGRAM

## 2024-08-04 RX ADMIN — CLOPIDOGREL BISULFATE 75 MG: 75 TABLET ORAL at 09:18

## 2024-08-04 RX ADMIN — ASPIRIN 81 MG 81 MG: 81 TABLET ORAL at 09:18

## 2024-08-04 RX ADMIN — ENOXAPARIN SODIUM 40 MG: 100 INJECTION SUBCUTANEOUS at 09:18

## 2024-08-04 RX ADMIN — CEFDINIR 300 MG: 300 CAPSULE ORAL at 09:18

## 2024-08-04 RX ADMIN — SODIUM CHLORIDE, PRESERVATIVE FREE 10 ML: 5 INJECTION INTRAVENOUS at 09:19

## 2024-08-04 RX ADMIN — CARVEDILOL 12.5 MG: 6.25 TABLET, FILM COATED ORAL at 17:20

## 2024-08-04 RX ADMIN — SPIRONOLACTONE 25 MG: 25 TABLET ORAL at 09:18

## 2024-08-04 RX ADMIN — CARVEDILOL 12.5 MG: 6.25 TABLET, FILM COATED ORAL at 09:18

## 2024-08-04 RX ADMIN — ATORVASTATIN CALCIUM 80 MG: 80 TABLET, FILM COATED ORAL at 20:30

## 2024-08-04 RX ADMIN — CEFDINIR 300 MG: 300 CAPSULE ORAL at 20:30

## 2024-08-04 RX ADMIN — SODIUM CHLORIDE, PRESERVATIVE FREE 10 ML: 5 INJECTION INTRAVENOUS at 20:30

## 2024-08-04 RX ADMIN — BUMETANIDE 1 MG: 0.25 INJECTION INTRAMUSCULAR; INTRAVENOUS at 09:18

## 2024-08-04 NOTE — PLAN OF CARE
Problem: Discharge Planning  Goal: Discharge to home or other facility with appropriate resources  8/3/2024 2134 by Li Garvey RN  Outcome: Progressing  Flowsheets (Taken 8/3/2024 2134)  Discharge to home or other facility with appropriate resources:   Identify barriers to discharge with patient and caregiver   Identify discharge learning needs (meds, wound care, etc)     Problem: Skin/Tissue Integrity  Goal: Absence of new skin breakdown  Description: 1.  Monitor for areas of redness and/or skin breakdown  2.  Assess vascular access sites hourly  3.  Every 4-6 hours minimum:  Change oxygen saturation probe site  4.  Every 4-6 hours:  If on nasal continuous positive airway pressure, respiratory therapy assess nares and determine need for appliance change or resting period.  8/3/2024 2134 by Li Garvey RN  Outcome: Progressing     Problem: Safety - Adult  Goal: Free from fall injury  8/3/2024 2134 by Li Garvey RN  Outcome: Progressing  Flowsheets (Taken 8/3/2024 2134)  Free From Fall Injury: Based on caregiver fall risk screen, instruct family/caregiver to ask for assistance with transferring infant if caregiver noted to have fall risk factors     Problem: Chronic Conditions and Co-morbidities  Goal: Patient's chronic conditions and co-morbidity symptoms are monitored and maintained or improved  8/3/2024 2134 by Li Garvey RN  Outcome: Progressing  Flowsheets (Taken 8/3/2024 2134)  Care Plan - Patient's Chronic Conditions and Co-Morbidity Symptoms are Monitored and Maintained or Improved: Monitor and assess patient's chronic conditions and comorbid symptoms for stability, deterioration, or improvement     Problem: Cardiovascular - Adult  Goal: Maintains optimal cardiac output and hemodynamic stability  8/3/2024 2134 by Li Garvey RN  Outcome: Progressing  Flowsheets (Taken 8/3/2024 1145 by Tuyet Santacruz, RN)  Maintains optimal cardiac output and hemodynamic stability:   Monitor

## 2024-08-04 NOTE — PLAN OF CARE
Problem: Cardiovascular - Adult  Goal: Maintains optimal cardiac output and hemodynamic stability  Outcome: Adequate for Discharge  Flowsheets (Taken 8/4/2024 0900)  Maintains optimal cardiac output and hemodynamic stability: Monitor blood pressure and heart rate

## 2024-08-05 VITALS
OXYGEN SATURATION: 100 % | SYSTOLIC BLOOD PRESSURE: 147 MMHG | HEART RATE: 69 BPM | TEMPERATURE: 97.5 F | WEIGHT: 183.6 LBS | BODY MASS INDEX: 27.19 KG/M2 | HEIGHT: 69 IN | RESPIRATION RATE: 20 BRPM | DIASTOLIC BLOOD PRESSURE: 105 MMHG

## 2024-08-05 LAB
ANION GAP SERPL CALC-SCNC: 9 MEQ/L (ref 8–16)
BUN SERPL-MCNC: 40 MG/DL (ref 7–22)
CA-I BLD ISE-SCNC: 1 MMOL/L (ref 1.12–1.32)
CA-I BLD ISE-SCNC: 1.16 MMOL/L (ref 1.12–1.32)
CALCIUM SERPL-MCNC: 8.4 MG/DL (ref 8.5–10.5)
CHLORIDE SERPL-SCNC: 94 MEQ/L (ref 98–111)
CO2 SERPL-SCNC: 28 MEQ/L (ref 23–33)
CREAT SERPL-MCNC: 1.3 MG/DL (ref 0.4–1.2)
GFR SERPL CREATININE-BSD FRML MDRD: 60 ML/MIN/1.73M2
GLUCOSE BLD STRIP.AUTO-MCNC: 167 MG/DL (ref 70–108)
GLUCOSE BLD STRIP.AUTO-MCNC: 243 MG/DL (ref 70–108)
GLUCOSE SERPL-MCNC: 153 MG/DL (ref 70–108)
POTASSIUM SERPL-SCNC: 3.9 MEQ/L (ref 3.5–5.2)
SODIUM SERPL-SCNC: 131 MEQ/L (ref 135–145)

## 2024-08-05 PROCEDURE — 2580000003 HC RX 258

## 2024-08-05 PROCEDURE — 2500000003 HC RX 250 WO HCPCS

## 2024-08-05 PROCEDURE — 99232 SBSQ HOSP IP/OBS MODERATE 35: CPT | Performed by: INTERNAL MEDICINE

## 2024-08-05 PROCEDURE — 6370000000 HC RX 637 (ALT 250 FOR IP): Performed by: STUDENT IN AN ORGANIZED HEALTH CARE EDUCATION/TRAINING PROGRAM

## 2024-08-05 PROCEDURE — 80048 BASIC METABOLIC PNL TOTAL CA: CPT

## 2024-08-05 PROCEDURE — 82948 REAGENT STRIP/BLOOD GLUCOSE: CPT

## 2024-08-05 PROCEDURE — 6370000000 HC RX 637 (ALT 250 FOR IP)

## 2024-08-05 PROCEDURE — 6360000002 HC RX W HCPCS

## 2024-08-05 PROCEDURE — 36415 COLL VENOUS BLD VENIPUNCTURE: CPT

## 2024-08-05 PROCEDURE — 82330 ASSAY OF CALCIUM: CPT

## 2024-08-05 PROCEDURE — 97116 GAIT TRAINING THERAPY: CPT

## 2024-08-05 PROCEDURE — 97110 THERAPEUTIC EXERCISES: CPT

## 2024-08-05 RX ORDER — BUMETANIDE 1 MG/1
1 TABLET ORAL 2 TIMES DAILY
Qty: 60 TABLET | Refills: 0 | Status: SHIPPED | OUTPATIENT
Start: 2024-08-05

## 2024-08-05 RX ORDER — CLOPIDOGREL BISULFATE 75 MG/1
75 TABLET ORAL DAILY
Qty: 30 TABLET | Refills: 0 | Status: SHIPPED | OUTPATIENT
Start: 2024-08-05

## 2024-08-05 RX ORDER — AMLODIPINE BESYLATE 10 MG/1
10 TABLET ORAL DAILY
Qty: 30 TABLET | Refills: 0 | Status: SHIPPED | OUTPATIENT
Start: 2024-08-05

## 2024-08-05 RX ORDER — CLOPIDOGREL BISULFATE 75 MG/1
75 TABLET ORAL DAILY
Qty: 30 TABLET | Refills: 0 | Status: SHIPPED | OUTPATIENT
Start: 2024-08-06 | End: 2024-08-05 | Stop reason: HOSPADM

## 2024-08-05 RX ORDER — AMLODIPINE AND BENAZEPRIL HYDROCHLORIDE 10; 40 MG/1; MG/1
1 CAPSULE ORAL DAILY
Qty: 30 CAPSULE | Refills: 3 | Status: SHIPPED | OUTPATIENT
Start: 2024-08-05 | End: 2024-08-05 | Stop reason: HOSPADM

## 2024-08-05 RX ADMIN — ENOXAPARIN SODIUM 40 MG: 100 INJECTION SUBCUTANEOUS at 08:45

## 2024-08-05 RX ADMIN — ASPIRIN 81 MG 81 MG: 81 TABLET ORAL at 08:45

## 2024-08-05 RX ADMIN — CARVEDILOL 12.5 MG: 6.25 TABLET, FILM COATED ORAL at 08:45

## 2024-08-05 RX ADMIN — SPIRONOLACTONE 25 MG: 25 TABLET ORAL at 08:45

## 2024-08-05 RX ADMIN — CALCIUM GLUCONATE 2000 MG: 20 INJECTION, SOLUTION INTRAVENOUS at 08:54

## 2024-08-05 RX ADMIN — CEFDINIR 300 MG: 300 CAPSULE ORAL at 08:45

## 2024-08-05 RX ADMIN — SODIUM CHLORIDE, PRESERVATIVE FREE 10 ML: 5 INJECTION INTRAVENOUS at 08:44

## 2024-08-05 RX ADMIN — CLOPIDOGREL BISULFATE 75 MG: 75 TABLET ORAL at 08:45

## 2024-08-05 RX ADMIN — INSULIN LISPRO 4 UNITS: 100 INJECTION, SOLUTION INTRAVENOUS; SUBCUTANEOUS at 12:45

## 2024-08-05 NOTE — PROGRESS NOTES
PROGRESS NOTE      Patient:  Gurpreet Broderick      Unit/Bed:3B-21/021-A    YOB: 1955    MRN: [id number]       Acct: [id number]     PCP: Sara Cardona APRN - CNP    Date of Admission: 7/31/2024      Assessment/Plan:    Acute on chronic systolic heart failure with EF of 10-15%  Ischemic cardiomyopathy    - Patient clinically volume overloaded  - Cardiology consulted, following - recommend LifeVest  - CXR showed pulmonary vascular congestion and BL pleural effusions  - Recently admitted and discharged 7/31 from [facility] - had a stent to LAD 7/15/24  - Echo on 7/11/24 at [facility] showed EF 15% --> Echo 8/1/24 showed EF of 10-15%, concentric remodeling. Severe global hypokinesis. Grade III diastolic dysfunction with increased LAP.   - pro-BNP 20,000 upon admission  - Continue diuresis with IV Bumex 1mg BID`  - Monitor I's and O's  - Fluid restriction less than 2L per day  - Palliative care consulted for goals of care discussion    CAD s/p stent to LAD 7/15/24    - Cath report from 7/15/24 reviewed in patient's folder - states \"continued on Plavix\" however patient is a poor historian and states he hasn't been taking any medications  - Given Plavix load of 300mg per pharmacist recommendations - continue 75mg daily after  - Continue asa, high intensity statin    ABRIL, improving  Cardiorenal Syndrome    - Cr 1.8 -> 1.6 -> 1.3  - Baseline Cr appears to be 0.9-1.1  - Nephrology following, appreciate recommendations  - Urine studies WNL  - No evidence of obstruction on renal US - showed probably BL renal cysts    Bilateral pleural effusions    - As seen on CXR  - Small left pleural effusion. Trace right pleural effusion    Elevated troponin    - High sensitivity troponin 33 -> 29 -> 27 -> flat  - Likely secondary to increased demand, per cardiology    Upper right lobe pneumonia    - As seen on CXR - Patchy right upper lobe infiltrate   - No cough, increased sputum production or fever - Discontinue IV abx -> 
  PROGRESS NOTE      Patient:  Gurpreet Broderick  Unit/Bed:3B-21/021-A  YOB: 1955  MRN: [id number]   Acct: [id number]    PCP: Sara Cardona APRN - CNP    Date of Admission: 7/31/2024 LOS: 3    Date of Evaluation:  8/4/2024    Anticipated Discharge: 8/5/24    Assessment/Plan:    Acute on chronic HFrEF, EF 10%, patient with LifeVest  Echo on 9/14/2023 with LVEF 40 to 45%, patient presents with SOB and bilateral lower extremity edema => proBNP 60960, UDS positive for cannabinoid and PCP.  Troponin trending 33 => 29 => 27.  Per records at [facility] patient had PCI stenting to diagonal on 7/15/2024 => per echo report from [facility] it was 15%. CXR on 7/31 shows mild cardiomegaly with bilateral pleural effusions concerns for pulmonary edema => patchy RUQ infiltrate versus atelectasis.  Patient was recently seen at [facility] and was treated for PNA.  Please see #3 for pneumonia treatment.  Cardiology consulted for further evaluation during hospitalization  Continue GDMT, Coreg, Bumex, Aldactone.  Entresto held due to renal function and currently getting diuresis.  Closely monitor blood pressure while on GDMT.   Strict I's and O's, daily weights 2 L fluid restriction, monitor electrolytes and replete per protocol  Off supplemental O2, breathing well on room air with SpO2 100%  Likely discharge tomorrow  ABRIL, secondary to cardiorenal syndrome  Patient has acute on chronic HFrEF => baseline creatinine 1.1 in September 2023. Creatinine trending 1.8 => 1.6 => 1.3 => 1.3 => 1.4.  Hold Bumex. US renal on 8/1 shows bilateral renal cyst, no concerns for hydronephrosis.  Monitor renal function with BMP  Nephrology following, appreciate recommendations  RUL PNA, POA  Chest x-ray on 7/31/2024 shows R UL infiltrate or atelectasis, was recently hospitalized at [facility] reportedly received treatment for PNA.  Was given 2 doses of IV Rocephin started on 8/1  Continue oral cefdinir 300 mg twice daily for 5 days  Monitor with BMP and CBC  CAD s/p 
  PROGRESS NOTE      Patient:  Gurpreet Broderick  Unit/Bed:3B-21/021-A  YOB: 1955  MRN: [id number]   Acct: [id number]    PCP: Sara Cardona APRN - CNP    Date of Admission: 7/31/2024 LOS: 2    Date of Evaluation:  8/3/2024    Anticipated Discharge: In the next 1 to 2 days    Assessment/Plan:    Acute on chronic HFrEF, EF 10%, patient is agreeable to LifeVest  Echo on 9/14/2023 with LVEF 40 to 45%, patient presents with SOB and bilateral lower extremity edema => proBNP 09835, UDS positive for cannabinoid and PCP.  Troponin trending 33 => 29 => 27.  Per records at [facility] patient had PCI stenting to diagonal on 7/15/2024 => per echo report from [facility] it was 15%.  CXR on 7/31 shows mild cardiomegaly with bilateral pleural effusions concerns for pulmonary edema => patchy RUQ infiltrate versus atelectasis.  Patient was recently seen at [facility] and was treated for PNA.  Please see #3 for pneumonia treatment.  Cardiology consulted for further evaluation during hospitalization  Continue GDMT, Coreg, Bumex, Aldactone.  Entresto held due to renal function and currently getting diuresis.  Closely monitor blood pressure while on GDMT  Will need LifeVest ordered prior to discharge => wean oxygen as tolerated => strict I's and O's, daily weights 2 L fluid restriction, monitor electrolytes and replete per protocol  ABRIL, secondary to cardiorenal syndrome  Patient has acute on chronic HFrEF => baseline creatinine 1.1 in September 2023.-Creatinine trending 1.8 => 1.6 => 1.3 => 1.3.  Improving with diuresis.  US renal on 8/1 shows bilateral renal cyst, no concerns for hydronephrosis.  Monitor renal function with [facility]  Nephrology following, appreciate recommendations  RUL PNA, POA  Chest x-ray on 7/31/2024 shows R UL infiltrate or atelectasis, was recently hospitalized at [facility] reportedly received treatment for PNA.  Was given 2 doses of IV Rocephin started on 8/1  Continue oral cefdinir 300 mg twice daily for 5 days  Monitor with BMP 
 [facility]  INPATIENT PHYSICAL THERAPY  DAILY NOTE  STRZ CCU-STEPDOWN 3B - 3B-21/021-A      Discharge Recommendations:  recommend 24 hour assist at discharge w/ cont therapy   Equipment Recommendations:Equipment Needed: Yes (Depending on D/C needs)  Other: recommend RW at discharge    Time In: 1120  Time Out: 1145  Timed Code Treatment Minutes: 25 Minutes  Minutes: 25          Date: 2024  Patient Name: Gurpreet Broderick,  Gender:  male        MRN: [id number]  : 1955  (69 y.o.)     Referring Practitioner: Leigha Luna MD  Diagnosis: Shortness of breath  Additional Pertinent Hx: Per EMR \"Gurpreet Broderick is a 69 y.o. male with PMHx of DMII, CAD, HLD who presents to [facility] with shortness of breath.  It is reported that the patient was just discharged today from [facility] due to pneumonia. After discharge patient states that he was very short of breath, hence he came to [facility]. He states that he has not been diagnosed with CHF in the past, but upon doing med rec it was noted that the patient had a new prescription for entresto and Bumex, which raises suspicion for medical compliance\"     Prior Level of Function:  Lives With: Family  Type of Home: House  Home Layout: Two level, Bed/Bath upstairs  Home Access: Stairs to enter with rails  Entrance Stairs - Number of Steps: 3  Entrance Stairs - Rails: Both  Home Equipment: None   Bathroom Shower/Tub: Walk-in shower  Bathroom Toilet: Handicap height  Bathroom Equipment: Grab bars in shower  Bathroom Accessibility: Accessible    Receives Help From: Family  ADL Assistance: Independent  Homemaking Assistance: Needs assistance  Ambulation Assistance: Independent  Transfer Assistance: Independent  Active : No  Additional Comments: Pt has 3 sons that live in [place] but does not see them often. Pt plans to live with his brohter after discharge and eventually go back home to his wife in [place]. Pt has been [place] for 7-6 months 
CLINICAL PHARMACY: DISCHARGE MED RECONCILIATION/REVIEW    [facility] Patient?: No  Total # of Interventions Recommended: 2 -    - Discontinued Medication #: 2  Total # Interventions Accepted: 1  Intervention Severity:   - Level 1 Intervention Present?: No   - Level 2 #: 2   - Level 3 #: 0   Time Spent (min): 15    Oscar MckeonD, BCPS  8/5/2024  2:23 PM    
Cardiology Progress Note      Patient:  Gurpreet Broderick  YOB: 1955  MRN: [id number]   Acct: [id number]  Admit Date:  7/31/2024  Primary Cardiologist:  none  Note per DR Sepulveda:  \"CHIEF COMPLAINT: Shortness of breath        HPI: This is a pleasant 69 y.o. male with a PMHx of T2DM, CAD (w/ stents), HLD, current everyday smoker, asthma, HTN, polysubstance use who presented to [facility] with shortness of breath. History obtained from electronic medical record and from patient at bedside, who is an unreliable historian. Per chart review, patient was just recently discharged from [facility] for pneumonia?. Patient reports he was admitted to [facility] for 8 days and discharged yesterday. Unable to remember what interventions or what medications were given during his admission. Believes that he might have been told that he had heart failure but cannot remember. States that he was able to  his discharge medications, but felt short of breath shortly afterwards and then presented to his hospital. On med rec, it was noted that patient was recently prescribed Bumex and Entresto. Patient reports he was experiencing severe shortness of breath and leg swelling yesterday but denies any other symptoms. Patient states he feels significantly improved today but continues to have some shortness of breath. Denies cough, lightheadedness, dizziness, syncope, chest pain, or palpitations.     Was admitted last year in 09/23 for acute ischemic stroke and vertebral artery stenosis. At that time, he was discharged on DAPT x3 weeks, continue on ASA indefinitely, and with Neurology follow up. Patient is unable to remember what medication he takes at home. States he does have a hard time remember what medications to take and admits to noncompliance.      Patient given 40 mg Lasix in ED. EKG with NSR, 1st degree HB, no ischemic changes noted. CXR revealed vascular congestion and pleural effusions.    \"    Subjective (Events in last 24 hours): 
[facility]  INPATIENT PHYSICAL THERAPY  EVALUATION  STRZ CCU-STEPDOWN 3B - 3B-21/021-A    Time In: 08  Time Out: 0844  Timed Code Treatment Minutes: 16 Minutes  Minutes: 25          Date: 2024  Patient Name: Gurpreet Broderick,  Gender:  male        MRN: [id number]  : 1955  (69 y.o.)      Referring Practitioner: Leigha Luna MD  Diagnosis: Shortness of breath  Additional Pertinent Hx: Per EMR \"Gurpreet Broderick is a 69 y.o. male with PMHx of DMII, CAD, HLD who presents to [facility] with shortness of breath.  It is reported that the patient was just discharged today from [facility] due to pneumonia. After discharge patient states that he was very short of breath, hence he came to [facility]. He states that he has not been diagnosed with CHF in the past, but upon doing med rec it was noted that the patient had a new prescription for entresto and Bumex, which raises suspicion for medical compliance\"     Restrictions/Precautions:  Restrictions/Precautions: General Precautions, Fall Risk  Position Activity Restriction  Other position/activity restrictions: RA at baseline, on 2L O2    Subjective:  Chart Reviewed: Yes  Patient assessed for rehabilitation services?: Yes  Family / Caregiver Present: No  Subjective: Pt OK to see per nursing. Pt in bed upon arrival and agreebale to therapy despite feeling tired. Pt denied feeling SOB throughout session.    General:  Overall Orientation Status: Within Normal Limits  Orientation Level: Oriented X4  Overall Cognitive Status: WNL  Vision: Impaired  Vision Exceptions: Wears glasses at all times (Pt states he hasn't worn them in over a year)  Hearing: Within functional limits       Pain: 0/10:     Vitals: Vitals not assessed per clinical judgement, see nursing flowsheet    Social/Functional History:    Lives With: Family  Type of Home: House  Home Layout: Two level, Bed/Bath upstairs  Home Access: Stairs to enter with rails  Entrance Stairs - Number of Steps: 
Discussed discharge summary with patient and daughter-in-law. Instructed patient about medications & follow up appointments. Patient denies any additional questions. Patient was discharged with all belongings. No distress noted. Patient discharged to home. Taken down to the vehicle by this RN per wheelchair. Patient discharged with life vest on. Patient received meds to beds.   
[facility]   Nephrology progress note  8/2/2024, 12:10 PM      Pt Name:    Gurpreet Broderick  MRN:     [id number]     YOB: 1955  Admit Date:    7/31/2024  9:57 PM    Chief Complaint: Nephrology following for ABRIL    Subjective:  Patient was seen and examined this morning  No chest pain or shortness of breath  Feels okay    Objective:  24HR INTAKE/OUTPUT:    Intake/Output Summary (Last 24 hours) at 8/2/2024 1210  Last data filed at 8/2/2024 0932  Gross per 24 hour   Intake 180 ml   Output 1000 ml   Net -820 ml         I/O last 3 completed shifts:  In: 0   Out: 1350 [Urine:1350]  I/O this shift:  In: 180 [P.O.:180]  Out: -    Admission weight: 81.6 kg (180 lb)  Wt Readings from Last 3 Encounters:   08/02/24 81.7 kg (180 lb 1.9 oz)   09/13/23 85.3 kg (188 lb)        Vitals :   Vitals:    08/01/24 2326 08/02/24 0340 08/02/24 0845 08/02/24 1105   BP: (!) 144/99 (!) 148/91 (!) 146/95 129/88   Pulse: 80 63 64 59   Resp: 22 18 18 18   Temp: 97.5 °F (36.4 °C) 97.5 °F (36.4 °C) 97.1 °F (36.2 °C) 97.8 °F (36.6 °C)   TempSrc: Oral Oral Oral Oral   SpO2: 100% 100% 100% 100%   Weight:  81.7 kg (180 lb 1.9 oz)     Height:           Physical examination  General Appearance: alert and cooperative with exam, appears comfortable, no distress  Mouth/Throat: Oral mucosa moist  Neck: No JVD  Lungs: Air entry B/L, no rales, no use of accessory muscles  Heart:  S1, S2 heard  GI: soft, non-tender, no guarding  Extremities: ++ LE edema    Medications:  Infusion:    sodium chloride      dextrose       Meds:    sodium chloride flush  5-40 mL IntraVENous 2 times per day    enoxaparin  40 mg SubCUTAneous Daily    aspirin  81 mg Oral Daily    atorvastatin  80 mg Oral Nightly    carvedilol  12.5 mg Oral BID WC    [Held by provider] spironolactone  25 mg Oral Daily    cefTRIAXone (ROCEPHIN) IV  1,000 mg IntraVENous Q24H    bumetanide  1 mg IntraVENous BID    clopidogrel  75 mg Oral Daily    insulin lispro  0-8 Units 
[facility]   Nephrology progress note  8/4/2024, 10:59 AM      Pt Name:    Gurpreet Broderick  MRN:     [id number]     YOB: 1955  Admit Date:    7/31/2024  9:57 PM    Chief Complaint: Nephrology following for ABRIL    Subjective:  Patient was seen and examined this morning  No chest pain or shortness of breath  Feels okay    Objective:  24HR INTAKE/OUTPUT:    Intake/Output Summary (Last 24 hours) at 8/4/2024 1059  Last data filed at 8/4/2024 0514  Gross per 24 hour   Intake 600 ml   Output 400 ml   Net 200 ml        Admission weight: 81.6 kg (180 lb)  Wt Readings from Last 3 Encounters:   08/04/24 82.4 kg (181 lb 10.5 oz)   09/13/23 85.3 kg (188 lb)        Vitals :   Vitals:    08/03/24 2245 08/04/24 0340 08/04/24 0514 08/04/24 0900   BP: (!) 136/93 (!) 144/94  (!) 166/101   Pulse: 59 63  64   Resp: 18 17  18   Temp: 97.6 °F (36.4 °C) 97.9 °F (36.6 °C)  98.7 °F (37.1 °C)   TempSrc: Oral Oral  Oral   SpO2: 100% 100%  96%   Weight:   82.4 kg (181 lb 10.5 oz)    Height:           Physical examination  General Appearance: Awake and alert no distress  Mouth/Throat: Oral mucosa moist  Neck: No JVD  Lungs: Air entry B/L, no rales, no use of accessory muscles  Heart:  S1, S2 heard  GI: soft, non-tender, no guarding  Extremities: Mild lower extremity edema    Medications:  Infusion:    sodium chloride      dextrose       Meds:    [Held by provider] bumetanide  1 mg Oral BID    [Held by provider] bumetanide  1 mg IntraVENous Q12H    cefdinir  300 mg Oral 2 times per day    insulin lispro  0-16 Units SubCUTAneous TID WC    insulin lispro  0-4 Units SubCUTAneous Nightly    sodium chloride flush  5-40 mL IntraVENous 2 times per day    enoxaparin  40 mg SubCUTAneous Daily    aspirin  81 mg Oral Daily    atorvastatin  80 mg Oral Nightly    carvedilol  12.5 mg Oral BID WC    spironolactone  25 mg Oral Daily    clopidogrel  75 mg Oral Daily       Lab Data :  CBC:   Recent Labs     08/02/24  3100 
[facility]   Nephrology progress note  8/5/2024, 11:54 AM      Pt Name:    Gurpreet Broderick  MRN:     [id number]     YOB: 1955  Admit Date:    7/31/2024  9:57 PM    Chief Complaint: Nephrology following for ABRIL    Subjective:  Patient was seen and examined this morning  No chest pain or shortness of breath      Objective:  24HR INTAKE/OUTPUT:    Intake/Output Summary (Last 24 hours) at 8/5/2024 1154  Last data filed at 8/5/2024 1105  Gross per 24 hour   Intake 1050 ml   Output 0 ml   Net 1050 ml         I/O last 3 completed shifts:  In: 1450 [P.O.:1450]  Out: 400 [Urine:400]  I/O this shift:  In: 200 [P.O.:200]  Out: -    Admission weight: 81.6 kg (180 lb)  Wt Readings from Last 3 Encounters:   08/05/24 83.3 kg (183 lb 9.6 oz)   09/13/23 85.3 kg (188 lb)        Vitals :   Vitals:    08/05/24 0552 08/05/24 0815 08/05/24 0830 08/05/24 1105   BP:  (!) 162/122 (!) 157/123 (!) 143/100   Pulse:  85  66   Resp:  20  20   Temp:  97.9 °F (36.6 °C)  97.4 °F (36.3 °C)   TempSrc:  Oral  Oral   SpO2:  100%  100%   Weight: 83.3 kg (183 lb 9.6 oz)      Height:           Physical examination  General Appearance:  no distress  Neck: No JVD  Lungs: Air entry B/L, no rales, no use of accessory muscles, poor effort  Heart:  S1, S2 heard  GI: soft, non-tender, no guarding  Extremities: +LE edema    Medications:  Infusion:    sodium chloride      dextrose       Meds:    [Held by provider] bumetanide  1 mg Oral BID    [Held by provider] bumetanide  1 mg IntraVENous Q12H    insulin lispro  0-16 Units SubCUTAneous TID WC    insulin lispro  0-4 Units SubCUTAneous Nightly    sodium chloride flush  5-40 mL IntraVENous 2 times per day    enoxaparin  40 mg SubCUTAneous Daily    aspirin  81 mg Oral Daily    atorvastatin  80 mg Oral Nightly    carvedilol  12.5 mg Oral BID WC    spironolactone  25 mg Oral Daily    clopidogrel  75 mg Oral Daily     Meds prn: sodium chloride flush, sodium chloride, potassium chloride 
Pharmacy Medication History Note      List of current medications patient is taking is complete.    Source of information: discharge from [facility] 7/16/24    Changes made to medication list:  Medications removed (include reason, ex. therapy complete or physician discontinued):  Sitagliptan-metformin - patient has not filled  Amlodipine-benazepril - therapy complete    Medications added/doses adjusted:  Entresto 24/26 BID  Plavix 75mg daily  Potassium 10 meq bid    Other notes (ex. Recent course of antibiotics, Coumadin dosing):  Patient has not been compliant with medications prescribed at discharge from [facility] (7/16/24)per report  Patient was discharged with antibiotic cefuroxime x 5 days but does not appear that patient filled.  Denies use of other OTC or herbal medications.    Allergies reviewed    Electronically signed by Jade Edouard RP on 8/1/2024 at 12:05 PM                                                     
[facility]  INPATIENT OCCUPATIONAL THERAPY  STRZ CCU-STEPDOWN 3B  EVALUATION    Time:   Time In: 1008  Time Out: 1026  Timed Code Treatment Minutes: 8 Minutes  Minutes: 18          Date: 2024  Patient Name: Gurpreet Broderick,   Gender: male      MRN: [id number]  : 1955  (69 y.o.)  Referring Practitioner: Leigha Luna MD  Diagnosis: shortness of breath  Additional Pertinent Hx: per chart review; Gurpreet Broderick is a 69 y.o. male with PMHx of DMII, CAD, HLD who presents to [facility] with shortness of breath.  It is reported that the patient was just discharged today from [facility] due to pneumonia. After discharge patient states that he was very short of breath, hence he came to [facility]. He states that he has not been diagnosed with CHF in the past, but upon doing med rec it was noted that the patient had a new prescription for entresto and Bumex, which raises suspicion for medical compliance.    Restrictions/Precautions:  Restrictions/Precautions: General Precautions, Fall Risk  Position Activity Restriction  Other position/activity restrictions: RA at baseline, on 2L O2    Subjective  Chart Reviewed: Yes, Orders, Progress Notes, History and Physical  Patient assessed for rehabilitation services?: Yes  Family / Caregiver Present: No    Subjective: RN approved session, patient in process of transferring to EOB upon OT arrival and agreeable to eval. patient A & O x 3.    Pain: 0/10:     Vitals: Vitals not assessed per clinical judgement, see nursing flowsheet  Oxygen: patient on 2 L O2 and stated he felt SOB once supine in bed with head elevated and cues for pursed lip breathing. Unable to get reading due to patient's cold hands. Informed RN.      Social/Functional History:  Lives With: Family  Type of Home: House  Home Layout: Two level, Bed/Bath upstairs  Home Access: Stairs to enter with rails  Entrance Stairs - Number of Steps: 3  Entrance Stairs - Rails: Both   Bathroom Shower/Tub: 
This  attempted to visit [place]ie, a 69 year old male sitting in his chair at the time of this visit. Patient is alone, calm and approachable. Purpose of this visit is a response to a spiritual consult to talk with patient about ACP, decision maker and Living Will code status preference.   During this encounter, patient shared with me he have never heard of ACP and Living Will Declarations for health code status. This gave this  more opportunity to careful walk through the document with patient explaining CPR, Agent/Decision Maker, CPR, Intubation, chest compression, resuscitation and ventilation if patient is in crisis.     Patient received copy of the document and said he will talk it over with his wife when she come in to to see him.    This  also offered words of encouragement and nurtured hope.   Spiritual Health staff will follow up to assist patient in completing document when patient is ready.     
  HCT 37.9* 35.8* 39.9*    206 232       CMP:  Recent Labs     08/01/24  0429 08/02/24  0506 08/03/24  0557    135 134*   K 4.3 4.0 3.9   CL 98 97* 95*   CO2 26 26 25   BUN 43* 42* 40*   CREATININE 1.6* 1.3* 1.3*   GLUCOSE 142* 147* 134*   CALCIUM 8.5 8.4* 8.6   MG 2.1 2.0 2.1       Hepatic: No results for input(s): \"LABALBU\", \"AST\", \"ALT\", \"BILITOT\", \"ALKPHOS\" in the last 72 hours.    Invalid input(s): \"ALB\"      Assessment and Plan:  ABRIL: Cardiorenal syndrome  Baseline serum creatinine 1.1 in September 2023.  Creatinine peaked at 1.8.  Today down to 1.3  Currently stable ultrasound scan normal  CHFrEF.  Continue with IV Bumex.  EF is 10 to 15%.  Volume status improving.  Follow BMP  Hypertension  History of CVA    D/W patient    Yogi Rahman MD  [facility]    This report has been created using voice recognition software. It may contain minor errors which are inherent in voice recognition technology  
enoxaparin  40 mg SubCUTAneous Daily    aspirin  81 mg Oral Daily    atorvastatin  80 mg Oral Nightly    carvedilol  12.5 mg Oral BID WC    [Held by provider] spironolactone  25 mg Oral Daily    cefTRIAXone (ROCEPHIN) IV  1,000 mg IntraVENous Q24H    bumetanide  1 mg IntraVENous BID    [START ON 8/2/2024] clopidogrel  75 mg Oral Daily    insulin lispro  0-8 Units SubCUTAneous TID     insulin lispro  0-4 Units SubCUTAneous Nightly     PRN Meds: sodium chloride flush, sodium chloride, potassium chloride **OR** potassium alternative oral replacement **OR** potassium chloride, magnesium sulfate, ondansetron **OR** ondansetron, polyethylene glycol, acetaminophen **OR** acetaminophen, glucose, dextrose bolus **OR** dextrose bolus, glucagon (rDNA), dextrose, calcium gluconate      Intake/Output Summary (Last 24 hours) at 8/1/2024 2029  Last data filed at 8/1/2024 1921  Gross per 24 hour   Intake 0 ml   Output 750 ml   Net -750 ml       Diet:  ADULT DIET; Regular; Low Fat/Low Chol/High Fiber/2 gm Na; 1800 ml    Exam:  BP (!) 126/93   Pulse 70   Temp 97.4 °F (36.3 °C) (Oral)   Resp 22   Ht 1.753 m (5' 9\")   Wt 83.2 kg (183 lb 6.4 oz)   SpO2 99%   BMI 27.08 kg/m²     General appearance: No apparent distress, appears stated age and cooperative.  HEENT: Pupils equal, round, and reactive to light. Conjunctivae/corneas clear.  Neck: Supple, with full range of motion. No jugular venous distention. Trachea midline.  Respiratory:  Normal respiratory effort. Clear to auscultation, bilaterally without Rales/Wheezes/Rhonchi.  Cardiovascular: Regular rate and rhythm with normal S1/S2 without murmurs, rubs or gallops.  Abdomen: Soft, non-tender, non-distended with normal bowel sounds.  Musculoskeletal: passive and active ROM x 4 extremities.  Skin: Skin color, texture, turgor normal.  No rashes or lesions.  Neurologic:  Neurovascularly intact without any focal sensory/motor deficits.   Psychiatric: Alert and oriented, thought 
BILIDIR <0.2 09/13/2023 04:10 PM       Magnesium:    Lab Results   Component Value Date/Time    MG 2.1 08/03/2024 05:57 AM       PT/INR:  No results found for: \"PROTIME\", \"INR\"    HgBA1c:    Lab Results   Component Value Date/Time    LABA1C 7.2 07/31/2024 10:22 PM       FLP:    Lab Results   Component Value Date/Time    TRIG 100 09/14/2023 04:08 AM    HDL 44 09/14/2023 04:08 AM       TSH:    Lab Results   Component Value Date/Time    TSH 0.357 09/13/2023 04:10 PM         Assessment:  Acute on chronic combined CHF-improved   EF 10-15%, 15% per [facility] records last month  ICMP  Hx of cAD/PCI to diagonal 07/15/24 at [facility]  ABRIL  Hx of CVA  HLD  + cannabis use     Plan:  Daily weights  2 g sodium restriction daily  2 L fluid restriction daily  Keep K>4, Mg>2    CHF Guidelines  BB-yes   ACE/ARB/Entresto- No, receiving diuresis. Will consider later  Diuretics-yes  Aldactone-resume today.   Jardiance/Farxiga-no. Depends on insurance.     He still as some leg swelling but off supplemental O2 now. Breathing improved. No orthopnea. May stop IV diuretics soon. Discussed importance of diuretics plus DAPT for recent heart stents. He is agreeable to lifevest as well.     Patient likely stable for discharge soon.   F/u with primary cardiologist in 2 weeks.      Electronically signed by Connor Mortimer, PA-C on 8/3/2024 at 9:30 AM

## 2024-08-05 NOTE — PLAN OF CARE
Problem: Discharge Planning  Goal: Discharge to home or other facility with appropriate resources  Outcome: Progressing  Flowsheets (Taken 8/4/2024 0900 by Tuyet Santacruz, RN)  Discharge to home or other facility with appropriate resources: Identify barriers to discharge with patient and caregiver  Note: From home     Problem: Skin/Tissue Integrity  Goal: Absence of new skin breakdown  Description: 1.  Monitor for areas of redness and/or skin breakdown  2.  Assess vascular access sites hourly  3.  Every 4-6 hours minimum:  Change oxygen saturation probe site  4.  Every 4-6 hours:  If on nasal continuous positive airway pressure, respiratory therapy assess nares and determine need for appliance change or resting period.  Outcome: Progressing     Problem: Safety - Adult  Goal: Free from fall injury  Outcome: Progressing  Flowsheets (Taken 8/3/2024 2134 by Li Garvey, RN)  Free From Fall Injury: Based on caregiver fall risk screen, instruct family/caregiver to ask for assistance with transferring infant if caregiver noted to have fall risk factors  Note: Bed locked & in low position, call light in reach, side-rails up x2, bed/chair alarm utilized, non-slip socks on when ambulating, reminded patient to use call light to call for assistance.      Problem: Chronic Conditions and Co-morbidities  Goal: Patient's chronic conditions and co-morbidity symptoms are monitored and maintained or improved  Outcome: Progressing  Flowsheets (Taken 8/4/2024 0900 by Tuyet Santacruz, RN)  Care Plan - Patient's Chronic Conditions and Co-Morbidity Symptoms are Monitored and Maintained or Improved: Monitor and assess patient's chronic conditions and comorbid symptoms for stability, deterioration, or improvement     Problem: Cardiovascular - Adult  Goal: Maintains optimal cardiac output and hemodynamic stability  Outcome: Progressing  Flowsheets (Taken 8/4/2024 0900 by Tuyet Santacruz, RN)  Maintains optimal cardiac output and

## 2024-08-05 NOTE — PALLIATIVE CARE
Initial Evaluation        Patient:   Gurpreet Broderick  YOB: 1955  Age:  69 y.o.  Room:  [facility]21/021-  MRN:  [id number]   Acct: [id number]    Date of Admission:  7/31/2024  9:57 PM  Date of Service:  8/2/2024  Completed By:  Karol Garcia RN        Reason for Palliative Care Evaluation:-   Goals of Care     Palliative Performance Scale   70%  Ambulation reduced; unable to perform normal job/work; significant  disease; able to do own self care; normal or reduced intake; fully conscious     History    CHF with EF 10-15%, CAD s/p stent to LAD 7/15/24, HTN, DM, ischemic stroke 9/23     Goals of Care Discussions and Plan         Family/Patient Discussion:- Patient resting in chair with eyes closed. Patient did not answer to stating name x2. Will follow at a later date.     Plan/Follow-Up:-   Will follow at a later date if patient remains hospitalized. Please contact palliative care if needs arise.       Code Status:Full Code No additional code details    ACP documents on file:  -None    Healthcare Power of /Healthcare Surrogate Decision Makers:    Bertha Borrego (child): [phone]  Eduarda Brody (child): [phone]  Soren Hemphill (child): [phone]            Electronically signed by Karol Garcia RN on 8/2/2024 at 3:56 PM           Palliative Care Office: [phone]        
hopeful for discharge today. Patient denied further questions.     Treatment Limitations: They would want to attempt to sustain life by all medically effective means. This includes providing appropriate medical and surgical treatments as indicated to attempt to prolong life, including intensive care, mechanical ventilation and CPR. This is consistent with a code status of full code.    Resuscitation Status: Full Code No additional code details    Documentation Completed:  -No new documents completed.    Plan/Follow-Up:  Will follow PRN. Continue current plan of care.     I spent 15 minutes with the patient and/or surrogate decision maker discussing the patient's wishes and goals.      Acacia Valencia RN              Electronically signed by Acacia Valencia RN on 8/5/2024 at 10:57 AM           Palliative Care Office: [phone]

## 2024-08-05 NOTE — DISCHARGE SUMMARY
DISCHARGE SUMMARY      Patient Identification:   Gurpreet Broderick   : 1955  MRN: [id number]   Account: [id number]      Patient's PCP: Sara Cardona APRN - CNP    Admit Date: 2024     Discharge Date: 2024    Admitting Physician: Alex OSORIO     Discharge Physician: Guero Mckoy DO     Discharge Diagnoses:    Acute on chronic HFrEF, EF 10%, patient with LifeVest  Echo on 2023 with LVEF 40 to 45%, patient presents with SOB and bilateral lower extremity edema => proBNP 10284, UDS positive for cannabinoid and PCP.  Troponin trending 33 => 29 => 27.  Per records at [facility] patient had PCI stenting to diagonal on 7/15/2024 => per echo report from [facility] it was 15%. CXR on  shows mild cardiomegaly with bilateral pleural effusions concerns for pulmonary edema => patchy RUQ infiltrate versus atelectasis.  Patient was recently seen at [facility] and was treated for PNA.  Please see #3 for pneumonia treatment.  Cardiology consulted for further evaluation during hospitalization  Continued GDMT, Coreg, Bumex, Aldactone.  Entresto held due to renal function and received diuresis.  Strict I's and O's, daily weights 2 L fluid restriction, monitor electrolytes and replete per protocol  Off supplemental O2, breathing well on room air with SpO2 100%  ABRIL, secondary to cardiorenal syndrome  Patient has acute on chronic HFrEF => baseline creatinine 1.1 in 2023. Creatinine trending 1.8 => 1.6 => 1.3 => 1.3 => 1.4 => 1.3. US renal on  shows bilateral renal cyst, no concerns for hydronephrosis.  Monitored renal function with BMP  Nephrology followed  RUL PNA, POA  Chest x-ray on 2024 shows R UL infiltrate or atelectasis, was recently hospitalized at [facility] reportedly received treatment for PNA.  Was given 2 doses of IV Rocephin started on   Transitioned to oral cefdinir 300 mg twice daily  Monitored with BMP and CBC  CAD s/p PCI to LAD 7/15/2024  Continued DAPT, Lipitor and Coreg  F/u

## 2024-08-05 NOTE — CARE COORDINATION
08/04/24  10:53 AM    Gurpreet Broderick was evaluated today and a DME order was entered for a wheeled walker with seat because he requires this to successfully complete daily living tasks of bathing, toileting, personal cares, ambulating, grooming, and hygiene.  A wheeled walker with seat is necessary due to the patient's unsteady gait, upper body weakness, inability to  and ambulation device, ambulating only short distances by pushing a walker, and the need to sit for a short time before resuming ambulation.  These tasks cannot be completed with a lesser ambulation device such as a cane, crutch, or standard walker.  The need for this equipment was discussed with the patient and he understands and is in agreement.     
8/1/24, 3:34 PM EDT    DISCHARGE PLANNING EVALUATION    Went to speak with patient and he was too tired to talk about discharge.  Left an ECF list on his table.  He agreed to meet with SW tomorrow.    
8/2/24, 2:30 PM EDT    DISCHARGE ON GOING EVALUATION    [facility] day: 1  Location: -21/021-A Reason for admit: Shortness of breath [R06.02]  Acute congestive heart failure, unspecified heart failure type (HCC) [I50.9]     Procedures:   8/1 Echo: EF 10-15%. Left ventricle mildly dilated. Severe global hypokinesis. Moderate Tricuspid regurgitation.     Imaging since last note:   8/1 Renal US:   1. Probable bilateral renal cysts.  2. Unremarkable urinary bladder.    Barriers to Discharge: Hospitalist, Cardiology, Nephrology and Palliative Care following. PT/OT. Life Vest ordered, paperwork faxed.     PCP: Sara Cardona APRN - CNP  Readmission Risk Score: 15.8    Patient Goals/Plan/Treatment Preferences: From home with family. SW following. Pt declining HH or SNF. Life Vest ordered, paperwork faxed to [facility]. Phone call to Dereje with [employer], message left.     
8/4/24, 10:50AM EDT    DISCHARGE ON GOING EVALUATION    Received call from Nya regarding discharge dispo. They state that Gurpreet is going to be staying with them at discharge, but they want him to go to SNF first. Explained that patient did well with therapies and was appropriate for SNF/insurance would likely decline SNF stay. They verbalized understanding and are agreeable to taking him to their home ( [address] [place] [address]). Discussed HH vs OP therapies. Soren and Eduarda feel Gurpreet would benefit most from OP therapies and they are able to provide transportation to/from. They prefer OP PT[facility]OT at [facility]. They also requested rollator to help patient ambulate safely. Stated they will pay for it out of pocket if they have to. Explained that insurance will cover cost as long as Gurpreet hasn't gotten a walker in the last 5 years, but there will be a co-pay. Also explained that DME offices are closed on the weekends for non-urgent deliveries and the rollator would need to be picked up at DME company of their choice Monday. Verbalized understanding.     Orders for outpatient PT/OT and rollator along with medical necessity note and DME list left in patient's room for family. Gurpreet had Life Vest in place on CM visit.     Electronically signed by Ynes Reyna RN on 8/4/2024 at 11:52 AM    
8/5/24, 12:17 PM EDT    DISCHARGE PLANNING EVALUATION    Spoke with Acacia in Palliative Care.  She told SW that the patient asked about getting new home health.  Told her he had declined last week as he did not want to be home bound.  He has been set up for outpatient PT and OT.  His family will take him.  Went to verify plan with him and could not keep him awake.  Will attempt again later.      8/5/24, 1:27 PM EDT    Patient goals/plan/ treatment preferences discussed by  and .  Patient goals/plan/ treatment preferences reviewed with patient/ family.  Patient/ family verbalize understanding of discharge plan and are in agreement with goal/plan/treatment preferences.  Understanding was demonstrated using the teach back method.  AVS provided by RN at time of discharge, which includes all necessary medical information pertaining to the patients current course of illness, treatment, post-discharge goals of care, and treatment preferences.     Services At/After Discharge: Outpatient, OT, and PT       Gurpreet will be discharged to home today with son and  with outpatient PT and OT through [facility].    
DISCHARGE PLANNING EVALUATION  8/2/24, 12:18 PM EDT    Reason for Referral: Family would like ECF placement.   Decision Maker: Patient makes his own decision.   Current Services: none  New Services Requested: Family was requesting an ECF yesterday.  Patient did well with therapy and would not be appropriate for an ECF.  He denies needs.    Family/ Social/ Home environment: From home with son.  He may go to his brother's home at discharge.  He said he is  and he is actually from [place] with his spouse.  His goal is to get back there.   Payment Source:[facility] Plan  Transportation at Discharge: Family   Post-acute (PAC) provider list was provided to patient. Patient was informed of their freedom to choose PAC provider. Discussed and offered to show the patient the relevant PAC Providers quality and resource use measures on Medicare Compare web site via computer based on patient's goals of care and treatment preferences. Questions regarding selection process were answered.      Teach Back Method used with Gurpreet regarding care plan and discharge planning.   Patient verbalized understanding of the plan of care and contribute to goal setting.       Patient preferences and discharge plan: Patient denies needs.  Plans to go home with his son or brother      Electronically signed by ALANNA Galarza on 8/2/2024 at 12:18 PM           
Caregiver Self   Accompanied By/Relationship son on phone and son's girlfriend   Support Systems Children;Family Members   Patient's Healthcare Decision Maker is: Legal Next of Kin  (Spiritual Care consulted)   PCP Verified by CM No  (Plan resident clinic)   Prior Functional Level Assistance with the following:;Housework;Cooking;Shopping   Current Functional Level Assistance with the following:;Cooking;Housework;Shopping   Can patient return to prior living arrangement Unknown at present   Ability to make needs known: Good   Family able to assist with home care needs: Yes   Would you like for me to discuss the discharge plan with any other family members/significant others, and if so, who? Yes  (son)   Financial Resources Medicare   Community Resources None   Social/Functional History   Active  No   Patient's  Info Family and friends   Discharge Planning   Type of Residence House  (Son's GF house)   Living Arrangements Family Members;Children  (Son, Son's  and [facility])   Current Services Prior To Admission None   Potential Assistance Needed Durable Medical Equipment   Potential DME Needed Walker;Oxygen Therapy (Comment)   DME Ordered? No   Potential Assistance Purchasing Medications No   Type of Home Care Services None   Patient expects to be discharged to: Skilled nursing facility   Services At/After Discharge   Mode of Transport at Discharge Other (see comment)  (Family)   Confirm Follow Up Transport Family   Condition of Participation: Discharge Planning   The Plan for Transition of Care is related to the following treatment goals: \"get well enough to get out of hospital\"

## 2024-08-06 LAB
BACTERIA BLD AEROBE CULT: NORMAL
BACTERIA BLD AEROBE CULT: NORMAL

## 2024-08-12 ENCOUNTER — OFFICE VISIT (OUTPATIENT)
Dept: FAMILY MEDICINE CLINIC | Age: 69
End: 2024-08-12

## 2024-08-12 VITALS
TEMPERATURE: 98.6 F | HEIGHT: 70 IN | HEART RATE: 60 BPM | WEIGHT: 170.4 LBS | SYSTOLIC BLOOD PRESSURE: 128 MMHG | DIASTOLIC BLOOD PRESSURE: 74 MMHG | BODY MASS INDEX: 24.39 KG/M2 | RESPIRATION RATE: 20 BRPM | OXYGEN SATURATION: 96 %

## 2024-08-12 DIAGNOSIS — D63.8 ANEMIA OF CHRONIC DISEASE: ICD-10-CM

## 2024-08-12 DIAGNOSIS — I10 PRIMARY HYPERTENSION: Primary | ICD-10-CM

## 2024-08-12 DIAGNOSIS — E11.22 TYPE 2 DIABETES MELLITUS WITH CHRONIC KIDNEY DISEASE, WITHOUT LONG-TERM CURRENT USE OF INSULIN, UNSPECIFIED CKD STAGE (HCC): ICD-10-CM

## 2024-08-12 DIAGNOSIS — Z09 HOSPITAL DISCHARGE FOLLOW-UP: ICD-10-CM

## 2024-08-12 DIAGNOSIS — E78.5 HYPERLIPIDEMIA, UNSPECIFIED HYPERLIPIDEMIA TYPE: ICD-10-CM

## 2024-08-12 DIAGNOSIS — I25.85 CHRONIC CORONARY MICROVASCULAR DYSFUNCTION: ICD-10-CM

## 2024-08-12 DIAGNOSIS — I50.9 CHRONIC CONGESTIVE HEART FAILURE, UNSPECIFIED HEART FAILURE TYPE (HCC): ICD-10-CM

## 2024-08-12 RX ORDER — ATORVASTATIN CALCIUM 80 MG/1
80 TABLET, FILM COATED ORAL NIGHTLY
Qty: 30 TABLET | Refills: 3 | Status: SHIPPED | OUTPATIENT
Start: 2024-08-12

## 2024-08-12 RX ORDER — BLOOD-GLUCOSE METER
1 KIT MISCELLANEOUS DAILY
Qty: 1 KIT | Refills: 0 | Status: SHIPPED | OUTPATIENT
Start: 2024-08-12

## 2024-08-12 NOTE — PROGRESS NOTES
82    Congestive hear failure: Chronic, acutely decompensated but improving.  Continue to follow-up with cardiology for heart monitor/LifeVest evaluation and repeat echocardiogram for ejection fraction.  This time continue medical therapy including Bumex, carvedilol, spironolactone, Farxiga, potassium    CAD: Chronic, history of.  Stable.  Continue aspirin, Plavix    Anemia of chronic diease: Chronic, history of.  No acute concerns at this time continue monitor    Health Maintenance Due   Topic Date Due    Pneumococcal 65+ years Vaccine (1 of 2 - PCV) Never done    Diabetic foot exam  Never done    Depression Screen  Never done    Diabetic Alb to Cr ratio (uACR) test  Never done    Diabetic retinal exam  Never done    Hepatitis C screen  Never done    DTaP/Tdap/Td vaccine (1 - Tdap) Never done    Colorectal Cancer Screen  Never done    Shingles vaccine (1 of 2) Never done    Respiratory Syncytial Virus (RSV) Pregnant or age 60 yrs+ (1 - 1-dose 60+ series) Never done    AAA screen  Never done    COVID-19 Vaccine (1 - 2023-24 season) Never done    Annual Wellness Visit (Medicare)  Never done    Flu vaccine (1) Never done       Attending Physician Statement  I have discussed the case, including pertinent history and exam findings with the resident. I also have seen the patient and performed key portions of the examination.  I agree with the documented assessment and plan as documented by the resident.    MANJU Solomon Jr.,  8/12/2024 3:44 PM

## 2024-08-12 NOTE — PROGRESS NOTES
failure, unspecified heart failure type (HCC). Recent hospital admission for heart failure exacerbation, EF 10%. Continue current GDMT.    -     [facility] Cardiology  -     Compression Stockings MISC; Starting Mon 8/12/2024, Disp-1 each, R-0, Print  -     TSH with Reflex; Future  Anemia of chronic disease. Last hemoglobin and MCV consistent with normocytic anemia and anemia of chronic disease. Pt denies signs and symptoms. Recommended continue to follow. Pt with recent ABRIL. Will continue to monitor.  Hospital discharge follow-up. Reviewed hospital events and discharge medications with the patient.   -     DC DISCHARGE MEDS RECONCILED W/ CURRENT OUTPATIENT MED LIST      Medical Decision Making: high complexity  Return in 1 month (on 9/12/2024) for chronic conditions f/u.           Subjective:   HPI:  Follow up of Hospital problems/diagnosis(es): Acute on Chronic HFrEF, ABRIL, rule out pneumonia, prolonged Qtc    Inpatient course: Discharge summary reviewed- see chart.    Interval history/Current status:   70 yo M with pmhx type 2 DM, hyperlipidemia, and hypertension seen today for ED follow up. Pt notes he went to the ED 2 weeks ago due to shortness of breath at rest and peipheral edema. In the ED pt was diagnosed with heart failure, with an EF of 10%, ABRIL and prolonged Qtc. Pt reports shortness of breath has resolved and peripheral edema was improved, lost 10 lbs of fluid since hospital admission. Pt is accompanied by his son's girlfriend, who notes pt did not take his medications for a year, now he moved in with them and she will be able to monitor. Pt was discharged and restarted on all medications, no side effects noted so far. Pt was being followed by a cardiologist at [facility], his son's girlfriend requests referral for cardiologist at [facility].     Type 2 DM  Pt not currently taking anything for diabetes, was previously on Janumet, stopped taking and during admission was not discharged on

## 2024-08-15 ENCOUNTER — HOSPITAL ENCOUNTER (OUTPATIENT)
Dept: OCCUPATIONAL THERAPY | Age: 69
Setting detail: THERAPIES SERIES
Discharge: HOME | End: 2024-08-15
Payer: COMMERCIAL

## 2024-08-15 ENCOUNTER — HOSPITAL ENCOUNTER (OUTPATIENT)
Dept: PHYSICAL THERAPY | Age: 69
Setting detail: THERAPIES SERIES
Discharge: HOME | End: 2024-08-15
Payer: COMMERCIAL

## 2024-08-15 PROBLEM — J18.9 PNEUMONIA OF RIGHT UPPER LOBE DUE TO INFECTIOUS ORGANISM: Status: ACTIVE | Noted: 2024-08-15

## 2024-08-15 PROCEDURE — 97162 PT EVAL MOD COMPLEX 30 MIN: CPT

## 2024-08-15 PROCEDURE — 97165 OT EVAL LOW COMPLEX 30 MIN: CPT

## 2024-08-15 NOTE — PROGRESS NOTES
** PLEASE SIGN, DATE AND TIME CERTIFICATION BELOW AND RETURN TO [facility] OUTPATIENT REHABILITATION (FAX #: [phone]).  ATTEST/CO-SIGN IF ACCESSING VIA [employer].  THANK YOU.**    I certify that I have examined the patient below and determined that Physical Medicine and Rehabilitation service is necessary and that I approve the established plan of care for up to 90 days or as specifically noted.  Attestation, signature or co-signature of physician indicates approval of certification requirements.    ________________________ ____________ __________  Physician Signature   Date   Time  [facility]  PHYSICAL THERAPY  [x] EVALUATION  [] DAILY NOTE (LAND) [] DAILY NOTE (AQUATIC ) [] PROGRESS NOTE [] DISCHARGE NOTE    [x] OUTPATIENT REHABILITATION [facility]   [] [facility]    [] [facility]   [] [facility]    Date: 8/15/2024  Patient Name:  Gurpreet Broderick  : 1955  MRN: [id number]  CSN: [id number]    Referring Practitioner Liana Atkins DO    Diagnosis Weakness                            Treatment Diagnosis M62.81 Generalized Muscle Weakness  R26.81  Unsteadiness on feet   Date of Evaluation 8/15/24    Additional Pertinent History HTN, DM, CVA, memory issues, CHF      Functional Outcome Measure Used 5XSTS   Functional Outcome Score 18 sec (8/15/24)       Insurance: Primary: Payor: [facility] HE[facility] PLAN /  /  / ,   Secondary:    Authorization Information: No pre-cert required   Approved Procedure Codes: Authorization of Specific CPT Codes Not Required  (Codes requested indicated by red font, codes approved indicated by black font)   Visit # 1, 1/10 for progress note (Reporting Period: 8/15/24 to     )   Visits Allowed: unlimited   Recertification Date: Oct 10, 2024   Physician Follow-Up:    Physician Orders: Eval and treat   History of Present Illness: Pt is a 70 y/o male presenting to skilled PT services with c/o weakness. Pt reports he currently

## 2024-08-15 NOTE — DISCHARGE SUMMARY
** PLEASE SIGN, DATE AND TIME CERTIFICATION BELOW AND RETURN TO [facility] OUTPATIENT REHABILITATION (FAX #: [phone]).  ATTEST/CO-SIGN IF ACCESSING VIA [employer].  THANK YOU.**    I certify that I have examined the patient below and determined that Physical Medicine and Rehabilitation service is necessary and that I approve the established plan of care for up to 90 days or as specifically noted.  Attestation, signature or co-signature of physician indicates approval of certification requirements.    ________________________ ____________ __________  Physician Signature   Date   Time   [facility]  OCCUPATIONAL THERAPY  [x] EVALUATION  [] DAILY NOTE (LAND) [] DAILY NOTE (AQUATIC ) [] PROGRESS NOTE [] DISCHARGE NOTE    [x] OUTPATIENT REHABILITATION [facility]   [] [facility]    [] [facility]   [] [facility]    Date: 8/15/2024  Patient Name:  Gurpreet Broderick  : 1955  MRN: [id number]  CSN: [id number]    Referring Practitioner Liana Atkins DO    Diagnosis Weakness   Treatment Diagnosis R53.1 Weakness  M62.81 Generalized Weakness   Date of Evaluation 8/15/24    Additional Pertinent History Gurpreet Broderick has a past medical history of Diabetes (HCC) and Hypertension.Also reports on medical history questionnaire arthritis, memory problems, CHF.      Functional Outcome Measure Used    Functional Outcome Score  (8/15/24)       Insurance: Primary: Payor: [facility] [employer] [facility] /  /  / ,   Secondary:    Authorization Information: PRE CERTIFICATION REQUIRED: No precert required.  INSURANCE THERAPY BENEFIT: Unlimited visits based on medical necessity.. .   AQUATIC THERAPY COVERED: Yes  MODALITIES COVERED:  Yes   Approved Procedure Codes: Authorization of Specific CPT Codes Not Required  (Codes requested indicated by red font, codes approved indicated by black font)   Visit # 1, 1/10 for progress note (Reporting Period: 8/15/24 to     )   Visits Allowed:

## 2024-08-22 ENCOUNTER — HOSPITAL ENCOUNTER (OUTPATIENT)
Dept: PHYSICAL THERAPY | Age: 69
Setting detail: THERAPIES SERIES
End: 2024-08-22
Payer: COMMERCIAL

## 2024-08-22 ENCOUNTER — APPOINTMENT (OUTPATIENT)
Dept: PHYSICAL THERAPY | Age: 69
End: 2024-08-22
Payer: COMMERCIAL

## 2024-08-29 ENCOUNTER — APPOINTMENT (OUTPATIENT)
Dept: PHYSICAL THERAPY | Age: 69
End: 2024-08-29
Payer: COMMERCIAL

## 2024-09-04 ENCOUNTER — TELEPHONE (OUTPATIENT)
Dept: CARDIOLOGY CLINIC | Age: 69
End: 2024-09-04

## 2024-09-05 DIAGNOSIS — E11.22 TYPE 2 DIABETES MELLITUS WITH CHRONIC KIDNEY DISEASE, WITHOUT LONG-TERM CURRENT USE OF INSULIN, UNSPECIFIED CKD STAGE (HCC): ICD-10-CM

## 2024-09-05 DIAGNOSIS — I25.85 CHRONIC CORONARY MICROVASCULAR DYSFUNCTION: Primary | ICD-10-CM

## 2024-09-05 RX ORDER — DAPAGLIFLOZIN 10 MG/1
10 TABLET, FILM COATED ORAL EVERY MORNING
Qty: 30 TABLET | Refills: 0 | Status: SHIPPED | OUTPATIENT
Start: 2024-09-05

## 2024-09-05 RX ORDER — ASPIRIN 81 MG/1
81 TABLET, CHEWABLE ORAL DAILY
Qty: 30 TABLET | Refills: 3 | Status: SHIPPED | OUTPATIENT
Start: 2024-09-05

## 2024-09-05 RX ORDER — SACUBITRIL AND VALSARTAN 24; 26 MG/1; MG/1
1 TABLET, FILM COATED ORAL 2 TIMES DAILY
Qty: 60 TABLET | Status: CANCELLED | OUTPATIENT
Start: 2024-09-05

## 2024-09-05 RX ORDER — POTASSIUM CHLORIDE 750 MG/1
10 TABLET, EXTENDED RELEASE ORAL 2 TIMES DAILY WITH MEALS
Qty: 60 TABLET | Status: CANCELLED | OUTPATIENT
Start: 2024-09-05

## 2024-09-05 RX ORDER — AMLODIPINE BESYLATE 10 MG/1
10 TABLET ORAL DAILY
Qty: 30 TABLET | Refills: 0 | Status: CANCELLED | OUTPATIENT
Start: 2024-09-05

## 2024-09-12 RX ORDER — POTASSIUM CHLORIDE 750 MG/1
10 TABLET, EXTENDED RELEASE ORAL 2 TIMES DAILY WITH MEALS
Qty: 60 TABLET | Refills: 0 | Status: SHIPPED | OUTPATIENT
Start: 2024-09-12

## 2024-09-12 RX ORDER — AMLODIPINE BESYLATE 10 MG/1
10 TABLET ORAL DAILY
Qty: 30 TABLET | Refills: 0 | Status: SHIPPED | OUTPATIENT
Start: 2024-09-12

## 2024-09-16 ENCOUNTER — TELEPHONE (OUTPATIENT)
Dept: FAMILY MEDICINE CLINIC | Age: 69
End: 2024-09-16

## 2024-09-18 LAB
BASOPHILS ABSOLUTE: 0.05 /ΜL
BASOPHILS RELATIVE PERCENT: 0.8 %
BUN BLDV-MCNC: 45 MG/DL
CALCIUM SERPL-MCNC: 10 MG/DL
CHLORIDE BLD-SCNC: 99 MMOL/L
CHOLESTEROL, TOTAL: 253 MG/DL
CHOLESTEROL/HDL RATIO: 5.2
CO2: 28 MMOL/L
CREAT SERPL-MCNC: 1.78 MG/DL
CREATININE URINE: NORMAL
EGFR: 41
EOSINOPHILS ABSOLUTE: 0.31 /ΜL
EOSINOPHILS RELATIVE PERCENT: 5.2 %
GLUCOSE BLD-MCNC: 127 MG/DL
HCT VFR BLD CALC: 49.4 % (ref 41–53)
HDLC SERPL-MCNC: 49 MG/DL (ref 35–70)
HEMOGLOBIN: 16.2 G/DL (ref 13.5–17.5)
LDL CHOLESTEROL: 178
LYMPHOCYTES ABSOLUTE: 1.9 /ΜL
LYMPHOCYTES RELATIVE PERCENT: 32.1 %
MCH RBC QN AUTO: 30.9 PG
MCHC RBC AUTO-ENTMCNC: 32.8 G/DL
MCV RBC AUTO: 94.1 FL
MICROALBUMIN/CREAT 24H UR: 12.4 MG/DL
MICROALBUMIN/CREAT UR-RTO: NORMAL
MONOCYTES ABSOLUTE: 0.66 /ΜL
MONOCYTES RELATIVE PERCENT: 11.2 %
NEUTROPHILS ABSOLUTE: 2.98 /ΜL
NEUTROPHILS RELATIVE PERCENT: 50.5 %
NONHDLC SERPL-MCNC: NORMAL MG/DL
PDW BLD-RTO: 14.4 %
PLATELET # BLD: 222 K/ΜL
PMV BLD AUTO: 11.5 FL
POTASSIUM SERPL-SCNC: 4.2 MMOL/L
RBC # BLD: 5.25 10^6/ΜL
SODIUM BLD-SCNC: 139 MMOL/L
TRIGL SERPL-MCNC: 131 MG/DL
TSH SERPL DL<=0.05 MIU/L-ACNC: 0.59 UIU/ML
VLDLC SERPL CALC-MCNC: 26 MG/DL
WBC # BLD: 5.9 10^3/ML

## 2024-09-19 ENCOUNTER — TELEPHONE (OUTPATIENT)
Dept: CARDIOLOGY CLINIC | Age: 69
End: 2024-09-19

## 2024-09-24 ENCOUNTER — TELEPHONE (OUTPATIENT)
Dept: FAMILY MEDICINE CLINIC | Age: 69
End: 2024-09-24

## 2025-03-06 NOTE — DISCHARGE SUMMARY
Discharge Summary    Patient:  Georgette Shannon  YOB: 1955    MRN: [id number]   Acct: [de-identified]    Primary Care Physician: ENOC Singh CNP    Admit date:  9/13/2023    Discharge date:   9/15/2023       Discharge Diagnoses:   Vertebral artery stenosis, right  Principal Problem:    Vertebral artery stenosis, right  Active Problems:    Acute ischemic stroke [facility])    Uncontrolled type 2 diabetes mellitus with hyperglycemia ([address])    Essential hypertension    Dyslipidemia    Macrocytosis    Current every day smoker    Marijuana use    Cocaine use    Right sided weakness  Resolved Problems:    * No resolved hospital problems. *        Admitted for: (HPI)  Georgette Shannon is a 62-year-old -American male presented to [facility] ER 9/13/2023 with chief complaint of right lower extremity right upper extremity weakness. Patient has a past medical history significant for current everyday smoker, asthma, essential hypertension, GERD, DMT2,  polysubstance use. Patient identifies use of cocaine within the last 24 hours. Patient recently moved to [place] [facility] in the last 4 to 6 weeks. Patient has no established PCP. Gail Roque presented from home, by private vehicle for evaluation of RUE and RLE weakness s/p fall from standing while moving from the bathroom to his bedroom around 4 AM this morning. Patient states that he felt weak and unsteady in both of his legs, causing him to fall. Last known well 2300 9/12/2023 when he went to bed. Patient states he felt weak and fell onto his right side without head trauma, LOC, or back pain. Assumed that the symptoms would resolve but when they had not this evening, he sought treatment and evaluation. Hospital Course:  79year old male admitted with complaints of right sided weakness. Patient was found to have acute infarct of left frontal lobe with multiple chronic infarcts. He has a vertebral artery stenosis at 80%.   He was seen
Patient/Caregiver provided printed discharge information.